# Patient Record
Sex: MALE | Race: OTHER | NOT HISPANIC OR LATINO
[De-identification: names, ages, dates, MRNs, and addresses within clinical notes are randomized per-mention and may not be internally consistent; named-entity substitution may affect disease eponyms.]

---

## 2017-02-15 ENCOUNTER — APPOINTMENT (OUTPATIENT)
Dept: CARDIOLOGY | Facility: CLINIC | Age: 57
End: 2017-02-15

## 2017-02-15 ENCOUNTER — NON-APPOINTMENT (OUTPATIENT)
Age: 57
End: 2017-02-15

## 2017-02-15 VITALS
DIASTOLIC BLOOD PRESSURE: 87 MMHG | RESPIRATION RATE: 14 BRPM | HEIGHT: 69 IN | HEART RATE: 70 BPM | OXYGEN SATURATION: 97 % | WEIGHT: 180 LBS | BODY MASS INDEX: 26.66 KG/M2 | SYSTOLIC BLOOD PRESSURE: 133 MMHG

## 2017-03-16 ENCOUNTER — EMERGENCY (EMERGENCY)
Facility: HOSPITAL | Age: 57
LOS: 0 days | Discharge: HOME | End: 2017-03-16
Attending: STUDENT IN AN ORGANIZED HEALTH CARE EDUCATION/TRAINING PROGRAM
Payer: COMMERCIAL

## 2017-03-16 VITALS
SYSTOLIC BLOOD PRESSURE: 159 MMHG | DIASTOLIC BLOOD PRESSURE: 96 MMHG | RESPIRATION RATE: 17 BRPM | OXYGEN SATURATION: 99 % | HEART RATE: 78 BPM | TEMPERATURE: 98 F | WEIGHT: 179.9 LBS | HEIGHT: 65 IN

## 2017-03-16 VITALS
DIASTOLIC BLOOD PRESSURE: 86 MMHG | OXYGEN SATURATION: 96 % | RESPIRATION RATE: 18 BRPM | HEART RATE: 64 BPM | TEMPERATURE: 98 F | SYSTOLIC BLOOD PRESSURE: 156 MMHG

## 2017-03-16 DIAGNOSIS — R19.7 DIARRHEA, UNSPECIFIED: ICD-10-CM

## 2017-03-16 DIAGNOSIS — I25.2 OLD MYOCARDIAL INFARCTION: ICD-10-CM

## 2017-03-16 DIAGNOSIS — I10 ESSENTIAL (PRIMARY) HYPERTENSION: ICD-10-CM

## 2017-03-16 DIAGNOSIS — Z79.82 LONG TERM (CURRENT) USE OF ASPIRIN: ICD-10-CM

## 2017-03-16 DIAGNOSIS — J18.9 PNEUMONIA, UNSPECIFIED ORGANISM: ICD-10-CM

## 2017-03-16 LAB
ALBUMIN SERPL ELPH-MCNC: 4 G/DL — SIGNIFICANT CHANGE UP (ref 3.3–5)
ALP SERPL-CCNC: 82 U/L — SIGNIFICANT CHANGE UP (ref 40–120)
ALT FLD-CCNC: 36 U/L — SIGNIFICANT CHANGE UP (ref 12–78)
AMYLASE P1 CFR SERPL: 34 U/L — SIGNIFICANT CHANGE UP (ref 25–115)
ANION GAP SERPL CALC-SCNC: 12 MMOL/L — SIGNIFICANT CHANGE UP (ref 5–17)
AST SERPL-CCNC: 18 U/L — SIGNIFICANT CHANGE UP (ref 15–37)
BASOPHILS # BLD AUTO: 0.1 K/UL — SIGNIFICANT CHANGE UP (ref 0–0.2)
BASOPHILS NFR BLD AUTO: 0.9 % — SIGNIFICANT CHANGE UP (ref 0–2)
BILIRUB DIRECT SERPL-MCNC: 0.13 MG/DL — SIGNIFICANT CHANGE UP (ref 0.05–0.2)
BILIRUB INDIRECT FLD-MCNC: 0.4 MG/DL — SIGNIFICANT CHANGE UP (ref 0.2–1)
BILIRUB SERPL-MCNC: 0.5 MG/DL — SIGNIFICANT CHANGE UP (ref 0.2–1.2)
BUN SERPL-MCNC: 8 MG/DL — SIGNIFICANT CHANGE UP (ref 7–23)
CALCIUM SERPL-MCNC: 8.6 MG/DL — SIGNIFICANT CHANGE UP (ref 8.5–10.1)
CHLORIDE SERPL-SCNC: 104 MMOL/L — SIGNIFICANT CHANGE UP (ref 96–108)
CO2 SERPL-SCNC: 24 MMOL/L — SIGNIFICANT CHANGE UP (ref 22–31)
CREAT SERPL-MCNC: 0.86 MG/DL — SIGNIFICANT CHANGE UP (ref 0.5–1.3)
EOSINOPHIL # BLD AUTO: 0.3 K/UL — SIGNIFICANT CHANGE UP (ref 0–0.5)
EOSINOPHIL NFR BLD AUTO: 4.8 % — SIGNIFICANT CHANGE UP (ref 0–6)
GLUCOSE SERPL-MCNC: 117 MG/DL — HIGH (ref 70–99)
HCT VFR BLD CALC: 44.1 % — SIGNIFICANT CHANGE UP (ref 39–50)
HGB BLD-MCNC: 16.2 G/DL — SIGNIFICANT CHANGE UP (ref 13–17)
LIDOCAIN IGE QN: 71 U/L — LOW (ref 73–393)
LYMPHOCYTES # BLD AUTO: 1.8 K/UL — SIGNIFICANT CHANGE UP (ref 1–3.3)
LYMPHOCYTES # BLD AUTO: 28.7 % — SIGNIFICANT CHANGE UP (ref 13–44)
MCHC RBC-ENTMCNC: 32.7 PG — SIGNIFICANT CHANGE UP (ref 27–34)
MCHC RBC-ENTMCNC: 36.8 GM/DL — HIGH (ref 32–36)
MCV RBC AUTO: 88.9 FL — SIGNIFICANT CHANGE UP (ref 80–100)
MONOCYTES # BLD AUTO: 0.3 K/UL — SIGNIFICANT CHANGE UP (ref 0–0.9)
MONOCYTES NFR BLD AUTO: 5.2 % — SIGNIFICANT CHANGE UP (ref 2–14)
NEUTROPHILS # BLD AUTO: 3.9 K/UL — SIGNIFICANT CHANGE UP (ref 1.8–7.4)
NEUTROPHILS NFR BLD AUTO: 60.4 % — SIGNIFICANT CHANGE UP (ref 43–77)
PLATELET # BLD AUTO: 165 K/UL — SIGNIFICANT CHANGE UP (ref 150–400)
POTASSIUM SERPL-MCNC: 4 MMOL/L — SIGNIFICANT CHANGE UP (ref 3.5–5.3)
POTASSIUM SERPL-SCNC: 4 MMOL/L — SIGNIFICANT CHANGE UP (ref 3.5–5.3)
PROT SERPL-MCNC: 7.2 GM/DL — SIGNIFICANT CHANGE UP (ref 6–8.3)
RBC # BLD: 4.96 M/UL — SIGNIFICANT CHANGE UP (ref 4.2–5.8)
RBC # FLD: 11.2 % — SIGNIFICANT CHANGE UP (ref 11–15)
SODIUM SERPL-SCNC: 140 MMOL/L — SIGNIFICANT CHANGE UP (ref 135–145)
WBC # BLD: 6.4 K/UL — SIGNIFICANT CHANGE UP (ref 3.8–10.5)
WBC # FLD AUTO: 6.4 K/UL — SIGNIFICANT CHANGE UP (ref 3.8–10.5)

## 2017-03-16 PROCEDURE — 71020: CPT | Mod: 26

## 2017-03-16 PROCEDURE — 99285 EMERGENCY DEPT VISIT HI MDM: CPT

## 2017-03-16 PROCEDURE — 74177 CT ABD & PELVIS W/CONTRAST: CPT | Mod: 26

## 2017-03-16 RX ORDER — IOHEXOL 300 MG/ML
30 INJECTION, SOLUTION INTRAVENOUS ONCE
Qty: 0 | Refills: 0 | Status: COMPLETED | OUTPATIENT
Start: 2017-03-16 | End: 2017-03-16

## 2017-03-16 RX ORDER — SODIUM CHLORIDE 9 MG/ML
1000 INJECTION INTRAMUSCULAR; INTRAVENOUS; SUBCUTANEOUS
Qty: 0 | Refills: 0 | Status: DISCONTINUED | OUTPATIENT
Start: 2017-03-16 | End: 2017-03-16

## 2017-03-16 RX ADMIN — IOHEXOL 30 MILLILITER(S): 300 INJECTION, SOLUTION INTRAVENOUS at 15:30

## 2017-03-16 RX ADMIN — SODIUM CHLORIDE 125 MILLILITER(S): 9 INJECTION INTRAMUSCULAR; INTRAVENOUS; SUBCUTANEOUS at 14:50

## 2017-03-16 NOTE — ED PROVIDER NOTE - MEDICAL DECISION MAKING DETAILS
pt presents today with lower abdominal pain which started during the night, labs and ct to rule out colitis, pt's pain improved own its own and he did not require pain medications pt presents today with lower abdominal pain which started during the night, labs and ct,pt's pain improved own its own and he did not require pain medications, ct did show diverticulosis and picked up a lower lobe infiltrate, chest xray ordered and confirms a LLL pneumonia, labs are within normal, pt is in no respiratory distress and looks well, first dose of antibiotic ordered IV, pt told to follow up with his pmd

## 2017-03-16 NOTE — ED PROVIDER NOTE - PROGRESS NOTE DETAILS
pt stable, ct abd/pelv did  a lower lobe pneumonia, pt asked about cough and states that he has been having a productive cough, (-) fevers (-) sick contacts (-) bodyaches or chills

## 2017-03-16 NOTE — ED PROVIDER NOTE - OBJECTIVE STATEMENT
56 year old male with h/o HTN, prior MI with one cardiac stent presents today c/o lower abdominal pain and nonbloody diarrhea which started in the middle of the night, he describes a burning pain in the lower abdomen rated 8/10 but now has improved to 6/10 (-) nausea (-) vomiting (-) fevers or chills (-) chest pains (-) sob(-) weakness (-) sick contacts

## 2017-03-16 NOTE — ED PROVIDER NOTE - CARE PLAN
Principal Discharge DX:	Abdominal pain  Secondary Diagnosis:	Diarrhea Principal Discharge DX:	Pneumonia  Secondary Diagnosis:	Diarrhea

## 2017-03-16 NOTE — ED ADULT NURSE NOTE - OBJECTIVE STATEMENT
received er bed 12 c/o diarrhea and lower abdominal cramps since middle of the night, denies n/v no blood noted in stools per pt abdomen soft, no distention noted

## 2017-05-15 ENCOUNTER — RX RENEWAL (OUTPATIENT)
Age: 57
End: 2017-05-15

## 2017-06-01 ENCOUNTER — INPATIENT (INPATIENT)
Facility: HOSPITAL | Age: 57
LOS: 0 days | Discharge: HOME | End: 2017-06-02
Attending: INTERNAL MEDICINE | Admitting: INTERNAL MEDICINE
Payer: COMMERCIAL

## 2017-06-01 VITALS
RESPIRATION RATE: 16 BRPM | SYSTOLIC BLOOD PRESSURE: 146 MMHG | OXYGEN SATURATION: 100 % | TEMPERATURE: 98 F | HEART RATE: 85 BPM | DIASTOLIC BLOOD PRESSURE: 85 MMHG

## 2017-06-01 DIAGNOSIS — Z98.890 OTHER SPECIFIED POSTPROCEDURAL STATES: Chronic | ICD-10-CM

## 2017-06-01 DIAGNOSIS — I24.9 ACUTE ISCHEMIC HEART DISEASE, UNSPECIFIED: ICD-10-CM

## 2017-06-01 DIAGNOSIS — E78.5 HYPERLIPIDEMIA, UNSPECIFIED: ICD-10-CM

## 2017-06-01 DIAGNOSIS — Z29.9 ENCOUNTER FOR PROPHYLACTIC MEASURES, UNSPECIFIED: ICD-10-CM

## 2017-06-01 DIAGNOSIS — I10 ESSENTIAL (PRIMARY) HYPERTENSION: ICD-10-CM

## 2017-06-01 LAB
ALBUMIN SERPL ELPH-MCNC: 4.5 G/DL — SIGNIFICANT CHANGE UP (ref 3.3–5)
ALP SERPL-CCNC: 69 U/L — SIGNIFICANT CHANGE UP (ref 40–120)
ALT FLD-CCNC: 22 U/L — SIGNIFICANT CHANGE UP (ref 4–41)
APTT BLD: 32.3 SEC — SIGNIFICANT CHANGE UP (ref 27.5–37.4)
AST SERPL-CCNC: 14 U/L — SIGNIFICANT CHANGE UP (ref 4–40)
BASOPHILS # BLD AUTO: 0.04 K/UL — SIGNIFICANT CHANGE UP (ref 0–0.2)
BASOPHILS NFR BLD AUTO: 0.4 % — SIGNIFICANT CHANGE UP (ref 0–2)
BILIRUB SERPL-MCNC: 0.3 MG/DL — SIGNIFICANT CHANGE UP (ref 0.2–1.2)
BUN SERPL-MCNC: 19 MG/DL — SIGNIFICANT CHANGE UP (ref 7–23)
CALCIUM SERPL-MCNC: 9.4 MG/DL — SIGNIFICANT CHANGE UP (ref 8.4–10.5)
CHLORIDE SERPL-SCNC: 103 MMOL/L — SIGNIFICANT CHANGE UP (ref 98–107)
CK MB BLD-MCNC: 2.32 NG/ML — SIGNIFICANT CHANGE UP (ref 1–6.6)
CK MB BLD-MCNC: 2.85 NG/ML — SIGNIFICANT CHANGE UP (ref 1–6.6)
CK SERPL-CCNC: 78 U/L — SIGNIFICANT CHANGE UP (ref 30–200)
CK SERPL-CCNC: 95 U/L — SIGNIFICANT CHANGE UP (ref 30–200)
CO2 SERPL-SCNC: 22 MMOL/L — SIGNIFICANT CHANGE UP (ref 22–31)
CREAT SERPL-MCNC: 0.96 MG/DL — SIGNIFICANT CHANGE UP (ref 0.5–1.3)
EOSINOPHIL # BLD AUTO: 0.51 K/UL — HIGH (ref 0–0.5)
EOSINOPHIL NFR BLD AUTO: 5.2 % — SIGNIFICANT CHANGE UP (ref 0–6)
GLUCOSE SERPL-MCNC: 127 MG/DL — HIGH (ref 70–99)
HCT VFR BLD CALC: 44.6 % — SIGNIFICANT CHANGE UP (ref 39–50)
HGB BLD-MCNC: 14.8 G/DL — SIGNIFICANT CHANGE UP (ref 13–17)
IMM GRANULOCYTES NFR BLD AUTO: 0.4 % — SIGNIFICANT CHANGE UP (ref 0–1.5)
INR BLD: 0.91 — SIGNIFICANT CHANGE UP (ref 0.88–1.17)
LYMPHOCYTES # BLD AUTO: 2.06 K/UL — SIGNIFICANT CHANGE UP (ref 1–3.3)
LYMPHOCYTES # BLD AUTO: 20.9 % — SIGNIFICANT CHANGE UP (ref 13–44)
MCHC RBC-ENTMCNC: 31.2 PG — SIGNIFICANT CHANGE UP (ref 27–34)
MCHC RBC-ENTMCNC: 33.2 % — SIGNIFICANT CHANGE UP (ref 32–36)
MCV RBC AUTO: 93.9 FL — SIGNIFICANT CHANGE UP (ref 80–100)
MONOCYTES # BLD AUTO: 0.54 K/UL — SIGNIFICANT CHANGE UP (ref 0–0.9)
MONOCYTES NFR BLD AUTO: 5.5 % — SIGNIFICANT CHANGE UP (ref 2–14)
NEUTROPHILS # BLD AUTO: 6.68 K/UL — SIGNIFICANT CHANGE UP (ref 1.8–7.4)
NEUTROPHILS NFR BLD AUTO: 67.6 % — SIGNIFICANT CHANGE UP (ref 43–77)
PLATELET # BLD AUTO: 222 K/UL — SIGNIFICANT CHANGE UP (ref 150–400)
PMV BLD: 10.3 FL — SIGNIFICANT CHANGE UP (ref 7–13)
POTASSIUM SERPL-MCNC: 4.2 MMOL/L — SIGNIFICANT CHANGE UP (ref 3.5–5.3)
POTASSIUM SERPL-SCNC: 4.2 MMOL/L — SIGNIFICANT CHANGE UP (ref 3.5–5.3)
PROT SERPL-MCNC: 7 G/DL — SIGNIFICANT CHANGE UP (ref 6–8.3)
PROTHROM AB SERPL-ACNC: 10.2 SEC — SIGNIFICANT CHANGE UP (ref 9.8–13.1)
RBC # BLD: 4.75 M/UL — SIGNIFICANT CHANGE UP (ref 4.2–5.8)
RBC # FLD: 12.4 % — SIGNIFICANT CHANGE UP (ref 10.3–14.5)
SODIUM SERPL-SCNC: 140 MMOL/L — SIGNIFICANT CHANGE UP (ref 135–145)
TROPONIN T SERPL-MCNC: < 0.06 NG/ML — SIGNIFICANT CHANGE UP (ref 0–0.06)
TROPONIN T SERPL-MCNC: < 0.06 NG/ML — SIGNIFICANT CHANGE UP (ref 0–0.06)
WBC # BLD: 9.87 K/UL — SIGNIFICANT CHANGE UP (ref 3.8–10.5)
WBC # FLD AUTO: 9.87 K/UL — SIGNIFICANT CHANGE UP (ref 3.8–10.5)

## 2017-06-01 PROCEDURE — 71020: CPT | Mod: 26

## 2017-06-01 RX ORDER — ASPIRIN/CALCIUM CARB/MAGNESIUM 324 MG
162 TABLET ORAL ONCE
Qty: 0 | Refills: 0 | Status: COMPLETED | OUTPATIENT
Start: 2017-06-01 | End: 2017-06-01

## 2017-06-01 RX ORDER — ISOSORBIDE MONONITRATE 60 MG/1
30 TABLET, EXTENDED RELEASE ORAL DAILY
Qty: 0 | Refills: 0 | Status: DISCONTINUED | OUTPATIENT
Start: 2017-06-01 | End: 2017-06-02

## 2017-06-01 RX ORDER — CARVEDILOL PHOSPHATE 80 MG/1
3.12 CAPSULE, EXTENDED RELEASE ORAL EVERY 12 HOURS
Qty: 0 | Refills: 0 | Status: DISCONTINUED | OUTPATIENT
Start: 2017-06-01 | End: 2017-06-02

## 2017-06-01 RX ORDER — SODIUM CHLORIDE 9 MG/ML
3 INJECTION INTRAMUSCULAR; INTRAVENOUS; SUBCUTANEOUS EVERY 8 HOURS
Qty: 0 | Refills: 0 | Status: DISCONTINUED | OUTPATIENT
Start: 2017-06-01 | End: 2017-06-02

## 2017-06-01 RX ORDER — TICAGRELOR 90 MG/1
90 TABLET ORAL
Qty: 0 | Refills: 0 | Status: DISCONTINUED | OUTPATIENT
Start: 2017-06-01 | End: 2017-06-02

## 2017-06-01 RX ORDER — ASPIRIN/CALCIUM CARB/MAGNESIUM 324 MG
81 TABLET ORAL DAILY
Qty: 0 | Refills: 0 | Status: DISCONTINUED | OUTPATIENT
Start: 2017-06-01 | End: 2017-06-02

## 2017-06-01 RX ORDER — ATORVASTATIN CALCIUM 80 MG/1
80 TABLET, FILM COATED ORAL AT BEDTIME
Qty: 0 | Refills: 0 | Status: DISCONTINUED | OUTPATIENT
Start: 2017-06-01 | End: 2017-06-02

## 2017-06-01 RX ORDER — LISINOPRIL 2.5 MG/1
5 TABLET ORAL DAILY
Qty: 0 | Refills: 0 | Status: DISCONTINUED | OUTPATIENT
Start: 2017-06-01 | End: 2017-06-02

## 2017-06-01 RX ORDER — ENOXAPARIN SODIUM 100 MG/ML
40 INJECTION SUBCUTANEOUS EVERY 24 HOURS
Qty: 0 | Refills: 0 | Status: DISCONTINUED | OUTPATIENT
Start: 2017-06-01 | End: 2017-06-02

## 2017-06-01 RX ADMIN — SODIUM CHLORIDE 3 MILLILITER(S): 9 INJECTION INTRAMUSCULAR; INTRAVENOUS; SUBCUTANEOUS at 22:25

## 2017-06-01 RX ADMIN — TICAGRELOR 90 MILLIGRAM(S): 90 TABLET ORAL at 22:25

## 2017-06-01 RX ADMIN — Medication 162 MILLIGRAM(S): at 15:09

## 2017-06-01 RX ADMIN — ATORVASTATIN CALCIUM 80 MILLIGRAM(S): 80 TABLET, FILM COATED ORAL at 22:25

## 2017-06-01 NOTE — H&P ADULT - NSHPLABSRESULTS_GEN_ALL_CORE
NSR @ 73b/ min, IRBBB, LVH, Q wave 3, V1  CXR preliminary = clear                         14.8   9.87  )-----------( 222      ( 01 Jun 2017 14:45 )             44.6   06-01    140  |  103  |  19  ----------------------------<  127<H>  4.2   |  22  |  0.96    Ca    9.4      01 Jun 2017 14:45    TPro  7.0  /  Alb  4.5  /  TBili  0.3  /  DBili  x   /  AST  14  /  ALT  22  /  AlkPhos  69  06-01    CARDIAC MARKERS ( 01 Jun 2017 14:45 )  x     / < 0.06 ng/mL / 95 u/L / 2.85 ng/mL / x

## 2017-06-01 NOTE — ED ADULT TRIAGE NOTE - CHIEF COMPLAINT QUOTE
Pt c/o Lt sided chest pain, intermittent, radiating to Lt side of his back for the past week. Pt denies sob, breathing even and unlabored resp at this time. Pt has Hx of 1 stent placed, htn. EKG obtained.

## 2017-06-01 NOTE — CONSULT NOTE ADULT - SUBJECTIVE AND OBJECTIVE BOX
Date of Admission: 6/1/2017    CHIEF COMPLAINT: chest pain    HISTORY OF PRESENT ILLNESS:  56M HTN, HLD, CAD s/p AWSTEMI 10/2016 c/b mild-mod LV dysfunction s/p synergy SOLE to prox and mid LAD presents with chest pain x several days duration. Chest pain radiates to left back, is non-exertional and non-positional. Pain can last up to several hours, and is worse with deep inspirations. Denies any dyspnea. Denies any fevers, chills or recent viral illness. Denies any PND, orthopnea or bilateral or unilateral LE edema. Denies any palpitations or syncope.      Allergies: NKA    MEDICATIONS:  asa 81  brilinta 90 bid  lisinopril 5  coreg 3.125  lipitor 80  imdur 30      PAST MEDICAL & SURGICAL HISTORY:  MI, old  HTN (hypertension)  No significant past surgical history      FAMILY HISTORY:  No pertinent family history in first degree relatives      SOCIAL HISTORY:    [ ] Non-smoker  [ ] Smoker  [ ] Alcohol      REVIEW OF SYSTEMS:  See HPI. Otherwise, 10 point ROS done and otherwise negative.    PHYSICAL EXAM:  T(C): 36.8, Max: 36.8 (06-01 @ 14:30)  HR: 85 (85 - 85)  BP: 146/85 (146/85 - 146/85)  RR: 16 (16 - 16)  SpO2: 100% (100% - 100%)  Wt(kg): --  I&O's Summary      Appearance: Normal	  HEENT:   Normal oral mucosa	  Lymphatic: No lymphadenopathy  Cardiovascular: Normal S1 S2, No JVD, No murmurs, No edema  Respiratory: Lungs clear to auscultation	  Psychiatry: A & O x 3, Mood & affect appropriate  Gastrointestinal:  Soft, Non-tender  Skin: No rashes, No ecchymoses, No cyanosis	  Neurologic: Non-focal  Extremities: Normal range of motion, No clubbing, cyanosis or edema      LABS:	 	    CBC Full  -  ( 01 Jun 2017 14:45 )  WBC Count : 9.87 K/uL  Hemoglobin : 14.8 g/dL  Hematocrit : 44.6 %  Platelet Count - Automated : 222 K/uL      06-01  140  |  103  |  19  ----------------------------<  127<H>  4.2   |  22  |  0.96        ECG: NSR iRBBB LVH  RADIOLOGY:  -CXR: (impression) - normal   OTHER: 	    PREVIOUS DIAGNOSTIC TESTING:    [ ] Echocardiogram: reviewed  [ ]  Catheterization: reviewed  	  ASSESSMENT/PLAN: 	  56M HTN, HLD, CAD s/p AWSTEMI 10/2016 c/b mild-mod LV dysfunction s/p synergy SOLE to prox and mid LAD presents with atypical chest pain, EKG and enzymes not suggestive of ACS  -cycle CE  -f/u official CXR read  -would obtain repeat TTE to apical aneurysm and evaluate LV function  -if CE neg, obtain exercise stress test with nuclear imaging  -c/w [facility]    Dennis Warren [id number]

## 2017-06-01 NOTE — ED ADULT NURSE NOTE - CHPI ED SYMPTOMS NEG
no nausea/no shortness of breath/no fever/no diaphoresis/no syncope/no vomiting/no dizziness/no cough

## 2017-06-01 NOTE — ED PROVIDER NOTE - ATTENDING CONTRIBUTION TO CARE
Pt was seen and evaluated by me. Pt states over the past week having intermittent left sided chest pain, achy in nature. Pt denies any associated SOB, nausea, vomiting, or abd pain. Pt has a history of CAD with 1 previous stent place. Lungs CTA b/l. RRR. Abd soft, non-tender.

## 2017-06-01 NOTE — H&P ADULT - NEGATIVE ENMT SYMPTOMS
no nose bleeds/no nasal congestion/no vertigo/no throat pain/no tinnitus/no sinus symptoms/no abnormal taste sensation/no gum bleeding

## 2017-06-01 NOTE — H&P ADULT - RS GEN PE MLT RESP DETAILS PC
breath sounds equal/clear to auscultation bilaterally/good air movement/airway patent/respirations non-labored

## 2017-06-01 NOTE — H&P ADULT - NSHPSOCIALHISTORY_GEN_ALL_CORE
and lives with spouse.  Smokes Nicotine 1/2p/d x 20 years, Does not want Nicotine replacement  ETOH 2 drinks/ day

## 2017-06-01 NOTE — H&P ADULT - PROBLEM SELECTOR PLAN 1
CM  F/U CE, EKG, TTE  give O2, ASA, Brilinta, BB, ACE, Nitrate, Statin  IF CE are negative. Pt to get a stress test

## 2017-06-01 NOTE — ED ADULT NURSE NOTE - OBJECTIVE STATEMENT
55 y/o male presents to ED with c/o chest discomfort.  Pt states that he started having cramping left sided chest pain that extends around to his back.  Pt states that the pain has been constant but varies in severity, pt states that the pain is worse when he gets out of bed in the morning. Pt has a hx of cardiac stent in Oct 2016, the pain that he is experiencing today is not like the pain when stent was placed.  Pt denies n/v/d, no cough or SOB, no diaphoresis, denies palpitations, no fever/chills.

## 2017-06-01 NOTE — ED PROVIDER NOTE - OBJECTIVE STATEMENT
57 y/o male with PMHx of CAD and HTN presents to ED left sided chest pain X 1 week. Pt states over the past week having intermittent achy left sided chest pain. Pt denies any associated fever, chills, nausea, vomiting, SOB, or abd pain. Pt states symptoms last a few hours then resolve. Pt has a previous stent and follows with Dr. Ellison.

## 2017-06-01 NOTE — ED PROVIDER NOTE - MEDICAL DECISION MAKING DETAILS
55 y/o male with left sided chest pain and history of CAD with previous stent. Concern for ACS. Labs, EKG, CXR

## 2017-06-01 NOTE — H&P ADULT - HISTORY OF PRESENT ILLNESS
56M with a history of HTN, dyslipidemia, CAD s/p Anterior Wall STEMI 10/2016, mild-mod LV dysfunction s/p synergy SOLE to prox and mid LAD experiencing intermittent, exertional, L sided chest, squeezing sensation that radiates to his L side back, intermittent with a 3 to 8/ 10 intensity, subsides on it's own and occurs 2 to 3 times a day, positive SOB, dry cough, no nausea vomit, chills, diaphoresis.     In the ED, josie pt is currently chest pain free, CE negative x 1, seen by the cardio fellow. Their consult and recommendations are in the chart.

## 2017-06-01 NOTE — H&P ADULT - NEGATIVE NEUROLOGICAL SYMPTOMS
no generalized seizures/no difficulty walking/no vertigo/no paresthesias/no tremors/no transient paralysis/no syncope/no weakness/no focal seizures

## 2017-06-02 VITALS — WEIGHT: 193.35 LBS

## 2017-06-02 LAB
BUN SERPL-MCNC: 18 MG/DL — SIGNIFICANT CHANGE UP (ref 7–23)
CALCIUM SERPL-MCNC: 9.4 MG/DL — SIGNIFICANT CHANGE UP (ref 8.4–10.5)
CHLORIDE SERPL-SCNC: 102 MMOL/L — SIGNIFICANT CHANGE UP (ref 98–107)
CHOLEST SERPL-MCNC: 119 MG/DL — LOW (ref 120–199)
CO2 SERPL-SCNC: 22 MMOL/L — SIGNIFICANT CHANGE UP (ref 22–31)
CREAT SERPL-MCNC: 0.79 MG/DL — SIGNIFICANT CHANGE UP (ref 0.5–1.3)
GLUCOSE SERPL-MCNC: 126 MG/DL — HIGH (ref 70–99)
HBA1C BLD-MCNC: 6.7 % — HIGH (ref 4–5.6)
HCT VFR BLD CALC: 44.3 % — SIGNIFICANT CHANGE UP (ref 39–50)
HDLC SERPL-MCNC: 54 MG/DL — SIGNIFICANT CHANGE UP (ref 35–55)
HGB BLD-MCNC: 14.9 G/DL — SIGNIFICANT CHANGE UP (ref 13–17)
LIPID PNL WITH DIRECT LDL SERPL: 49 MG/DL — SIGNIFICANT CHANGE UP
MCHC RBC-ENTMCNC: 31.7 PG — SIGNIFICANT CHANGE UP (ref 27–34)
MCHC RBC-ENTMCNC: 33.6 % — SIGNIFICANT CHANGE UP (ref 32–36)
MCV RBC AUTO: 94.3 FL — SIGNIFICANT CHANGE UP (ref 80–100)
PLATELET # BLD AUTO: 185 K/UL — SIGNIFICANT CHANGE UP (ref 150–400)
PMV BLD: 10.9 FL — SIGNIFICANT CHANGE UP (ref 7–13)
POTASSIUM SERPL-MCNC: 3.9 MMOL/L — SIGNIFICANT CHANGE UP (ref 3.5–5.3)
POTASSIUM SERPL-SCNC: 3.9 MMOL/L — SIGNIFICANT CHANGE UP (ref 3.5–5.3)
RBC # BLD: 4.7 M/UL — SIGNIFICANT CHANGE UP (ref 4.2–5.8)
RBC # FLD: 12.7 % — SIGNIFICANT CHANGE UP (ref 10.3–14.5)
SODIUM SERPL-SCNC: 140 MMOL/L — SIGNIFICANT CHANGE UP (ref 135–145)
TRIGL SERPL-MCNC: 90 MG/DL — SIGNIFICANT CHANGE UP (ref 10–149)
WBC # BLD: 8.06 K/UL — SIGNIFICANT CHANGE UP (ref 3.8–10.5)
WBC # FLD AUTO: 8.06 K/UL — SIGNIFICANT CHANGE UP (ref 3.8–10.5)

## 2017-06-02 PROCEDURE — 78452 HT MUSCLE IMAGE SPECT MULT: CPT | Mod: 26,59

## 2017-06-02 PROCEDURE — 93306 TTE W/DOPPLER COMPLETE: CPT | Mod: 26

## 2017-06-02 PROCEDURE — 93018 CV STRESS TEST I&R ONLY: CPT | Mod: GC

## 2017-06-02 PROCEDURE — 99222 1ST HOSP IP/OBS MODERATE 55: CPT

## 2017-06-02 PROCEDURE — 93016 CV STRESS TEST SUPVJ ONLY: CPT | Mod: GC

## 2017-06-02 RX ORDER — TICAGRELOR 90 MG/1
1 TABLET ORAL
Qty: 0 | Refills: 0 | COMMUNITY

## 2017-06-02 RX ADMIN — SODIUM CHLORIDE 3 MILLILITER(S): 9 INJECTION INTRAMUSCULAR; INTRAVENOUS; SUBCUTANEOUS at 05:33

## 2017-06-02 RX ADMIN — CARVEDILOL PHOSPHATE 3.12 MILLIGRAM(S): 80 CAPSULE, EXTENDED RELEASE ORAL at 05:33

## 2017-06-02 RX ADMIN — SODIUM CHLORIDE 3 MILLILITER(S): 9 INJECTION INTRAMUSCULAR; INTRAVENOUS; SUBCUTANEOUS at 13:57

## 2017-06-02 RX ADMIN — LISINOPRIL 5 MILLIGRAM(S): 2.5 TABLET ORAL at 05:33

## 2017-06-02 RX ADMIN — ISOSORBIDE MONONITRATE 30 MILLIGRAM(S): 60 TABLET, EXTENDED RELEASE ORAL at 11:40

## 2017-06-02 RX ADMIN — Medication 81 MILLIGRAM(S): at 11:40

## 2017-06-02 RX ADMIN — TICAGRELOR 90 MILLIGRAM(S): 90 TABLET ORAL at 05:33

## 2017-06-02 RX ADMIN — ENOXAPARIN SODIUM 40 MILLIGRAM(S): 100 INJECTION SUBCUTANEOUS at 05:33

## 2017-06-02 NOTE — PATIENT PROFILE ADULT. - NS TRANSFER PATIENT BELONGINGS
Cell Phone/PDA (specify)/Clothing/1 [employer] grey flip open cell phone without , 1 pair of jeans, 1 pair of black socks, 1 pair of black/white nike sneakers, 1 black [facility] backpack

## 2017-06-02 NOTE — DISCHARGE NOTE ADULT - PLAN OF CARE
compliance with medications, follow up with physicians activity - as tolerated and with assistance   low salt &  low cholesterol follow up with your doctor in a week Dr Fontana [phone] have patient outpatient follow up

## 2017-06-02 NOTE — DISCHARGE NOTE ADULT - MEDICATION SUMMARY - MEDICATIONS TO STOP TAKING
I will STOP taking the medications listed below when I get home from the hospital:    Levaquin 750 mg oral tablet  -- 1 tab(s) by mouth every 24 hours  -- Avoid prolonged or excessive exposure to direct and/or artificial sunlight while taking this medication.  Do not take dairy products, antacids, or iron preparations within one hour of this medication.  Finish all this medication unless otherwise directed by prescriber.  May cause drowsiness or dizziness.  Medication should be taken with plenty of water.    codeine-guaiFENesin 10 mg-100 mg/5 mL oral syrup  -- 10 milliliter(s) by mouth every 6 hours MDD:40 (fourty)  -- Caution federal law prohibits the transfer of this drug to any person other  than the person for whom it was prescribed.  May cause drowsiness.  Alcohol may intensify this effect.  Use care when operating dangerous machinery.  Medication should be taken with plenty of water.  Obtain medical advice before taking any non-prescription drugs as some may affect the action of this medication.

## 2017-06-02 NOTE — DISCHARGE NOTE ADULT - ADDITIONAL INSTRUCTIONS
follow up with your doctor in a week Dr Renee [phone] follow up with your doctor in a week Dr Renee [phone]  follow up with your doctor in a week Dr Douglas Crockett

## 2017-06-02 NOTE — DISCHARGE NOTE ADULT - MEDICATION SUMMARY - MEDICATIONS TO TAKE
I will START or STAY ON the medications listed below when I get home from the hospital:    aspirin 81 mg oral delayed release tablet  -- 1 tab(s) by mouth once a day  -- Indication: For cardiac protection    lisinopril 5 mg oral tablet  -- 1 tab(s) by mouth once a day  -- Indication: For blood pressure    isosorbide mononitrate 30 mg oral tablet, extended release  -- 1 tab(s) by mouth once a day  -- Indication: For chest pain    atorvastatin 80 mg oral tablet  -- 1 tab(s) by mouth once a day (at bedtime)  -- Indication: For cholesterol    ticagrelor 90 mg oral tablet  -- 1 tab(s) by mouth 2 times a day  -- Indication: For Heart disease    carvedilol 3.125 mg oral tablet  -- 1 tab(s) by mouth every 12 hours  -- Indication: For Heart disease

## 2017-06-02 NOTE — DISCHARGE NOTE ADULT - HOSPITAL COURSE
56M admitted for chest pain r/o ACS.    CXR = clear  EKG NSR @ 73b/ min , IRBBB, LVH  CE negative x 2  Glucose = 127  Cardio consult in the chart:   -cycle CE  -f/u official CXR read  -would obtain repeat TTE to apical aneurysm and evaluate LV function  -if CE neg, obtain exercise stress test with nuclear imaging  -c/w home meds  6/2- TTE-  Normal left ventricular internal dimensions and wall  thicknesses.  2. Mild segmental left ventricular systolic dysfunction the  distal anteroseptum appears hypokinetic. Overall EF appears  preserved.  3. Mild diastolic dysfunction (Stage I).  4. Normal right ventricular size and function.  *** Compared with echocardiogram of 10/11/2016, the LV  function appears improved. 56M admitted for chest pain r/o ACS.    CXR = clear  EKG NSR @ 73b/ min , IRBBB, LVH  CE negative x 2  Glucose = 127  Cardio consult in the chart:   -cycle CE  -f/u official CXR read  -would obtain repeat TTE to apical aneurysm and evaluate LV function  -if CE neg, obtain exercise stress test with nuclear imaging  -c/w home meds  6/2- TTE-  Normal left ventricular internal dimensions and wall  thicknesses.  2. Mild segmental left ventricular systolic dysfunction the  distal anteroseptum appears hypokinetic. Overall EF appears  preserved.  3. Mild diastolic dysfunction (Stage I).  4. Normal right ventricular size and function.  *** Compared with echocardiogram of 10/11/2016, the LV  function appears improved.  Stress test was done and results was negative.

## 2017-06-02 NOTE — DISCHARGE NOTE ADULT - CARE PROVIDER_API CALL
Jd Renee (MD), Cardiovascular Disease; Internal Medicine; Nuclear Cardiology  [address] [address] [place] [address]  Phone: [phone]  Fax: [phone]

## 2017-06-02 NOTE — DISCHARGE NOTE ADULT - VISION (WITH CORRECTIVE LENSES IF THE PATIENT USUALLY WEARS THEM):
wears glasses to read fine print/Partially impaired: cannot see medication labels or newsprint, but can see obstacles in path, and the surrounding layout; can count fingers at arm's length

## 2017-06-02 NOTE — DISCHARGE NOTE ADULT - PATIENT PORTAL LINK FT
“You can access the [employer] Patient Portal, offered by [facility], by registering with the following website: [email or web address]”

## 2017-06-02 NOTE — PATIENT PROFILE ADULT. - VISION (WITH CORRECTIVE LENSES IF THE PATIENT USUALLY WEARS THEM):
Partially impaired: cannot see medication labels or newsprint, but can see obstacles in path, and the surrounding layout; can count fingers at arm's length/wears glasses to read fine print

## 2017-06-02 NOTE — DISCHARGE NOTE ADULT - CARE PLAN
Principal Discharge DX:	Acute coronary syndrome  Goal:	compliance with medications, follow up with physicians  Instructions for follow-up, activity and diet:	activity - as tolerated and with assistance   low salt &  low cholesterol  Secondary Diagnosis:	Acute kidney injury  Goal:	follow up with your doctor in a week Dr Fontana [phone]  Instructions for follow-up, activity and diet:	have patient outpatient follow up Principal Discharge DX:	Acute coronary syndrome  Goal:	compliance with medications, follow up with physicians  Instructions for follow-up, activity and diet:	activity - as tolerated and with assistance   low salt &  low cholesterol  Secondary Diagnosis:	Acute kidney injury  Goal:	follow up with your doctor in a week Dr Fontana [phone]  Instructions for follow-up, activity and diet:	have patient outpatient follow up Principal Discharge DX:	Acute coronary syndrome  Goal:	compliance with medications, follow up with physicians  Instructions for follow-up, activity and diet:	activity - as tolerated and with assistance   low salt &  low cholesterol  Secondary Diagnosis:	Acute kidney injury  Goal:	follow up with your doctor in a week Dr Fontana [phone]  Instructions for follow-up, activity and diet:	have patient outpatient follow up Principal Discharge DX:	Acute coronary syndrome  Goal:	compliance with medications, follow up with physicians  Instructions for follow-up, activity and diet:	activity - as tolerated and with assistance   low salt &  low cholesterol

## 2017-06-14 ENCOUNTER — APPOINTMENT (OUTPATIENT)
Dept: CARDIOLOGY | Facility: CLINIC | Age: 57
End: 2017-06-14

## 2017-06-14 ENCOUNTER — NON-APPOINTMENT (OUTPATIENT)
Age: 57
End: 2017-06-14

## 2017-06-14 VITALS
HEIGHT: 69 IN | HEART RATE: 70 BPM | SYSTOLIC BLOOD PRESSURE: 137 MMHG | BODY MASS INDEX: 28.44 KG/M2 | WEIGHT: 192 LBS | DIASTOLIC BLOOD PRESSURE: 89 MMHG | OXYGEN SATURATION: 93 %

## 2017-06-14 DIAGNOSIS — I25.2 OLD MYOCARDIAL INFARCTION: ICD-10-CM

## 2017-06-22 PROBLEM — I25.2 HISTORY OF ST ELEVATION MYOCARDIAL INFARCTION (STEMI): Status: RESOLVED | Noted: 2017-06-22 | Resolved: 2017-06-22

## 2017-07-10 ENCOUNTER — APPOINTMENT (OUTPATIENT)
Dept: GASTROENTEROLOGY | Facility: CLINIC | Age: 57
End: 2017-07-10

## 2017-07-10 VITALS
DIASTOLIC BLOOD PRESSURE: 80 MMHG | BODY MASS INDEX: 29.03 KG/M2 | OXYGEN SATURATION: 98 % | TEMPERATURE: 98.7 F | SYSTOLIC BLOOD PRESSURE: 140 MMHG | HEIGHT: 69 IN | WEIGHT: 196 LBS | HEART RATE: 85 BPM

## 2017-07-10 DIAGNOSIS — Z82.49 FAMILY HISTORY OF ISCHEMIC HEART DISEASE AND OTHER DISEASES OF THE CIRCULATORY SYSTEM: ICD-10-CM

## 2017-07-10 RX ORDER — MULTIVITAMIN
TABLET ORAL
Refills: 0 | Status: ACTIVE | COMMUNITY

## 2017-09-20 ENCOUNTER — APPOINTMENT (OUTPATIENT)
Dept: CARDIOLOGY | Facility: CLINIC | Age: 57
End: 2017-09-20

## 2017-10-30 ENCOUNTER — RX RENEWAL (OUTPATIENT)
Age: 57
End: 2017-10-30

## 2017-12-20 ENCOUNTER — APPOINTMENT (OUTPATIENT)
Dept: CARDIOLOGY | Facility: CLINIC | Age: 57
End: 2017-12-20
Payer: COMMERCIAL

## 2017-12-20 ENCOUNTER — NON-APPOINTMENT (OUTPATIENT)
Age: 57
End: 2017-12-20

## 2017-12-20 VITALS
HEIGHT: 69 IN | BODY MASS INDEX: 30.66 KG/M2 | HEART RATE: 89 BPM | WEIGHT: 207 LBS | RESPIRATION RATE: 14 BRPM | SYSTOLIC BLOOD PRESSURE: 123 MMHG | DIASTOLIC BLOOD PRESSURE: 88 MMHG

## 2017-12-20 PROCEDURE — 99214 OFFICE O/P EST MOD 30 MIN: CPT

## 2017-12-20 PROCEDURE — 93000 ELECTROCARDIOGRAM COMPLETE: CPT

## 2017-12-22 ENCOUNTER — INPATIENT (INPATIENT)
Facility: HOSPITAL | Age: 57
LOS: 0 days | Discharge: HOME | End: 2017-12-23
Attending: INTERNAL MEDICINE | Admitting: INTERNAL MEDICINE
Payer: COMMERCIAL

## 2017-12-22 ENCOUNTER — TRANSCRIPTION ENCOUNTER (OUTPATIENT)
Age: 57
End: 2017-12-22

## 2017-12-22 VITALS — HEIGHT: 69 IN | WEIGHT: 207.01 LBS

## 2017-12-22 DIAGNOSIS — R82.90 UNSPECIFIED ABNORMAL FINDINGS IN URINE: ICD-10-CM

## 2017-12-22 DIAGNOSIS — Z98.890 OTHER SPECIFIED POSTPROCEDURAL STATES: Chronic | ICD-10-CM

## 2017-12-22 LAB
BUN SERPL-MCNC: 11 MG/DL — SIGNIFICANT CHANGE UP (ref 7–23)
CALCIUM SERPL-MCNC: 9 MG/DL — SIGNIFICANT CHANGE UP (ref 8.4–10.5)
CHLORIDE SERPL-SCNC: 101 MMOL/L — SIGNIFICANT CHANGE UP (ref 98–107)
CO2 SERPL-SCNC: 24 MMOL/L — SIGNIFICANT CHANGE UP (ref 22–31)
CREAT SERPL-MCNC: 0.71 MG/DL — SIGNIFICANT CHANGE UP (ref 0.5–1.3)
GLUCOSE SERPL-MCNC: 187 MG/DL — HIGH (ref 70–99)
HBA1C BLD-MCNC: 7.8 % — HIGH (ref 4–5.6)
HCT VFR BLD CALC: 43.9 % — SIGNIFICANT CHANGE UP (ref 39–50)
HGB BLD-MCNC: 14.4 G/DL — SIGNIFICANT CHANGE UP (ref 13–17)
MCHC RBC-ENTMCNC: 30.6 PG — SIGNIFICANT CHANGE UP (ref 27–34)
MCHC RBC-ENTMCNC: 32.8 % — SIGNIFICANT CHANGE UP (ref 32–36)
MCV RBC AUTO: 93.4 FL — SIGNIFICANT CHANGE UP (ref 80–100)
NRBC # FLD: 0 — SIGNIFICANT CHANGE UP
PLATELET # BLD AUTO: 194 K/UL — SIGNIFICANT CHANGE UP (ref 150–400)
PMV BLD: 10.5 FL — SIGNIFICANT CHANGE UP (ref 7–13)
POTASSIUM SERPL-MCNC: 4 MMOL/L — SIGNIFICANT CHANGE UP (ref 3.5–5.3)
POTASSIUM SERPL-SCNC: 4 MMOL/L — SIGNIFICANT CHANGE UP (ref 3.5–5.3)
RBC # BLD: 4.7 M/UL — SIGNIFICANT CHANGE UP (ref 4.2–5.8)
RBC # FLD: 11.9 % — SIGNIFICANT CHANGE UP (ref 10.3–14.5)
SODIUM SERPL-SCNC: 136 MMOL/L — SIGNIFICANT CHANGE UP (ref 135–145)
WBC # BLD: 5.43 K/UL — SIGNIFICANT CHANGE UP (ref 3.8–10.5)
WBC # FLD AUTO: 5.43 K/UL — SIGNIFICANT CHANGE UP (ref 3.8–10.5)

## 2017-12-22 PROCEDURE — 93010 ELECTROCARDIOGRAM REPORT: CPT

## 2017-12-22 PROCEDURE — 92928 PRQ TCAT PLMT NTRAC ST 1 LES: CPT | Mod: LD,GC

## 2017-12-22 PROCEDURE — 93458 L HRT ARTERY/VENTRICLE ANGIO: CPT | Mod: 26,59,GC

## 2017-12-22 PROCEDURE — 93571 IV DOP VEL&/PRESS C FLO 1ST: CPT | Mod: 26,LD,GC

## 2017-12-22 PROCEDURE — 99152 MOD SED SAME PHYS/QHP 5/>YRS: CPT

## 2017-12-22 RX ORDER — INFLUENZA VIRUS VACCINE 15; 15; 15; 15 UG/.5ML; UG/.5ML; UG/.5ML; UG/.5ML
0.5 SUSPENSION INTRAMUSCULAR ONCE
Qty: 0 | Refills: 0 | Status: DISCONTINUED | OUTPATIENT
Start: 2017-12-22 | End: 2017-12-23

## 2017-12-22 RX ORDER — ASPIRIN/CALCIUM CARB/MAGNESIUM 324 MG
81 TABLET ORAL DAILY
Qty: 0 | Refills: 0 | Status: DISCONTINUED | OUTPATIENT
Start: 2017-12-22 | End: 2017-12-22

## 2017-12-22 RX ORDER — TICAGRELOR 90 MG/1
90 TABLET ORAL
Qty: 0 | Refills: 0 | Status: DISCONTINUED | OUTPATIENT
Start: 2017-12-22 | End: 2017-12-23

## 2017-12-22 RX ORDER — ASPIRIN/CALCIUM CARB/MAGNESIUM 324 MG
81 TABLET ORAL DAILY
Qty: 0 | Refills: 0 | Status: DISCONTINUED | OUTPATIENT
Start: 2017-12-23 | End: 2017-12-23

## 2017-12-22 RX ORDER — LISINOPRIL 2.5 MG/1
5 TABLET ORAL DAILY
Qty: 0 | Refills: 0 | Status: DISCONTINUED | OUTPATIENT
Start: 2017-12-22 | End: 2017-12-23

## 2017-12-22 RX ORDER — ISOSORBIDE MONONITRATE 60 MG/1
30 TABLET, EXTENDED RELEASE ORAL DAILY
Qty: 0 | Refills: 0 | Status: DISCONTINUED | OUTPATIENT
Start: 2017-12-22 | End: 2017-12-23

## 2017-12-22 RX ORDER — ATORVASTATIN CALCIUM 80 MG/1
80 TABLET, FILM COATED ORAL AT BEDTIME
Qty: 0 | Refills: 0 | Status: DISCONTINUED | OUTPATIENT
Start: 2017-12-22 | End: 2017-12-23

## 2017-12-22 RX ORDER — CARVEDILOL PHOSPHATE 80 MG/1
3.12 CAPSULE, EXTENDED RELEASE ORAL EVERY 12 HOURS
Qty: 0 | Refills: 0 | Status: DISCONTINUED | OUTPATIENT
Start: 2017-12-22 | End: 2017-12-23

## 2017-12-22 RX ADMIN — TICAGRELOR 90 MILLIGRAM(S): 90 TABLET ORAL at 18:07

## 2017-12-22 RX ADMIN — ISOSORBIDE MONONITRATE 30 MILLIGRAM(S): 60 TABLET, EXTENDED RELEASE ORAL at 18:07

## 2017-12-22 RX ADMIN — ATORVASTATIN CALCIUM 80 MILLIGRAM(S): 80 TABLET, FILM COATED ORAL at 22:15

## 2017-12-22 RX ADMIN — CARVEDILOL PHOSPHATE 3.12 MILLIGRAM(S): 80 CAPSULE, EXTENDED RELEASE ORAL at 18:07

## 2017-12-22 RX ADMIN — LISINOPRIL 5 MILLIGRAM(S): 2.5 TABLET ORAL at 18:07

## 2017-12-22 RX ADMIN — Medication 200 MILLIGRAM(S): at 22:15

## 2017-12-22 NOTE — DISCHARGE NOTE ADULT - HOSPITAL COURSE
57 year old male with history of CAD s/p anterior wall STEMI (10/2016 pLAD, dLAD), hypertension, hyperlipidemia, BPH, Mild-moderate LV dysfunction presents to [facility] for cardiac catheterization after experiencing recent chest pain and palpitations.    Cath : A successful balloon angioplasty with stent was performed on the 60 % lesion in the mid LAD. Following intervention there was a 1 % residual stenosis.  DIAGNOSTIC RECOMMENDATIONS: PCI of mid LAD lesion for treatment of unstable angina.  Continue aspirin, 81 mg, PO, daily.  Continue ticagrelor 90 mg twice daily. Patient management should include aggressive medical therapy. 57 year old male with history of CAD s/p anterior wall STEMI (10/2016 pLAD, dLAD), hypertension, hyperlipidemia, BPH, Mild-moderate LV dysfunction presents to [facility] for cardiac catheterization after experiencing recent chest pain and palpitations.    Cath : A successful balloon angioplasty with stent was performed on the 60 % lesion in the mid LAD. Following intervention there was a 1 % residual stenosis.  DIAGNOSTIC RECOMMENDATIONS: PCI of mid LAD lesion for treatment of unstable angina.  Continue aspirin, 81 mg, PO, daily.  Continue ticagrelor 90 mg twice daily. Patient management should include aggressive medical therapy.  12/23- As per attending, patient stable for discharge.

## 2017-12-22 NOTE — H&P CARDIOLOGY - FAMILY HISTORY
No pertinent family history in first degree relatives Father  Still living? Unknown  Family history of hypertension, Age at diagnosis: Age Unknown     Mother  Still living? Unknown  Family history of hypertension, Age at diagnosis: Age Unknown     Sibling  Still living? Unknown  Family history of hypertension, Age at diagnosis: Age Unknown

## 2017-12-22 NOTE — H&P CARDIOLOGY - RS GEN PE MLT RESP DETAILS PC
no rhonchi/respirations non-labored/airway patent/normal/breath sounds equal/no subcutaneous emphysema/no wheezes/clear to auscultation bilaterally/good air movement

## 2017-12-22 NOTE — H&P CARDIOLOGY - PMH
HTN (hypertension)    Hyperlipidemia    MI, old HTN (hypertension)    Hyperlipidemia    Ischemic cardiomyopathy    MI, old    Stented coronary artery

## 2017-12-22 NOTE — H&P CARDIOLOGY - HISTORY OF PRESENT ILLNESS
57 year old male with history of CAD s/p anterior wall STEMI (10/2016 pLAD, dLAD), hypertension, hyperlipidemia, BPH, Mild-moderate LV dysfunction presents to [facility] for cardiac catheterization. Patient has complaints of intermittent chest pain and palpitations over the past few days that are unaffected by activity. Patient is currently sitting comfortably in bed and denied SOB, HA, dizziness, N/V/D/C, abdominal pain, and diaphoresis at this time. Due to the patients significant medical history and recent onset of symptoms there is a high risk for obstructive CAD. Patient was referred by Dr. Ellison to  [facility] for cardiac catheterization.

## 2017-12-22 NOTE — DISCHARGE NOTE ADULT - CARE PLAN
Principal Discharge DX:	CAD S/P percutaneous coronary angioplasty  Goal:	To prevent chest pain, heart attack and worsening coronary artery disease  Instructions for follow-up, activity and diet:	Continue aspirin and Brilinta, do not stop unless instructed by physician.  Follow up with cardiologist  Continue low cholesterol diet  Secondary Diagnosis:	HTN (hypertension)  Goal:	To maintain a normal blood pressure to prevent heart attack, stroke and renal failure  Instructions for follow-up, activity and diet:	Home blood pressure monitoring.  Low sodium diet, Follow up with primary care physician.  Continue anti-hypertensive medications  Secondary Diagnosis:	Hyperlipidemia  Goal:	To maintain normal cholesterol levels to prevent stroke and heart attack  Instructions for follow-up, activity and diet:	Low fat diet, Continue current medications. Follow up with primary care physician and cardiologist

## 2017-12-22 NOTE — DISCHARGE NOTE ADULT - ADDITIONAL INSTRUCTIONS
Follow up with Cardiologist and PMD within one week of discharge. Call for appointment. Return to ED for any concerning symptoms. Continue medications as prescribed. Low salt, low fat, low cholesterol diet. No heavy lifting greater than 5-10 pounds with right wrist x one week. No strenuous activity x 3 weeks. Monitor site of procedure and notify your doctor for any redness, swelling, discharge No heavy lifting x one week. No strenuous activity x 3 weeks. Monitor site of procedure and notify your doctor for any redness, swelling, discharge. No driving x 24 hours. You may shower but no baths or swimming x one week.

## 2017-12-22 NOTE — DISCHARGE NOTE ADULT - PLAN OF CARE
To prevent chest pain, heart attack and worsening coronary artery disease Continue aspirin and Brilinta, do not stop unless instructed by physician.  Follow up with cardiologist  Continue low cholesterol diet To maintain a normal blood pressure to prevent heart attack, stroke and renal failure Home blood pressure monitoring.  Low sodium diet, Follow up with primary care physician.  Continue anti-hypertensive medications To maintain normal cholesterol levels to prevent stroke and heart attack Low fat diet, Continue current medications. Follow up with primary care physician and cardiologist

## 2017-12-22 NOTE — DISCHARGE NOTE ADULT - CARE PROVIDER_API CALL
Sidney Ellison), Cardiovascular Disease; Internal Medicine; Interventional Cardiology  [address] [address] [place] [address]  [place] [address]  Phone: [phone]  Fax: [phone]

## 2017-12-22 NOTE — CHART NOTE - NSCHARTNOTEFT_GEN_A_CORE
Status post Cath, Right Radial and femoral sites without bleeding or hematoma.  Dressing is intact.  Positive Pulses, Capillary refill less than two seconds.  Will continue to monitor.                                                                                                                                                  ALBERT Rios, RPA-C [id number]

## 2017-12-22 NOTE — DISCHARGE NOTE ADULT - PATIENT PORTAL LINK FT
“You can access the [employer] Patient Portal, offered by [facility], by registering with the following website: [email or web address]”

## 2017-12-22 NOTE — DISCHARGE NOTE ADULT - NS AS ACTIVITY OBS
No heavy lifting x one week. No strenuous activity x 3 weeks. Monitor site of procedure and notify your doctor for any redness, swelling, discharge. No driving x 24 hours. You may shower but no baths or swimming x one week.

## 2017-12-22 NOTE — PATIENT PROFILE ADULT. - VISION (WITH CORRECTIVE LENSES IF THE PATIENT USUALLY WEARS THEM):
Normal vision: sees adequately in most situations; can see medication labels, newsprint/wears glasses to read fine print

## 2017-12-22 NOTE — DISCHARGE NOTE ADULT - MEDICATION SUMMARY - MEDICATIONS TO TAKE
I will START or STAY ON the medications listed below when I get home from the hospital:    aspirin 81 mg oral delayed release tablet  -- 1 tab(s) by mouth once a day  -- Indication: For Stented coronary artery    lisinopril 5 mg oral tablet  -- 1 tab(s) by mouth once a day  -- Indication: For HTN     isosorbide mononitrate 30 mg oral tablet, extended release  -- 1 tab(s) by mouth once a day  -- Indication: For Chest Pain     atorvastatin 80 mg oral tablet  -- 1 tab(s) by mouth once a day (at bedtime)  -- Indication: For Hyperlididemia     ticagrelor 90 mg oral tablet  -- 1 tab(s) by mouth 2 times a day  -- Indication: For CAD     carvedilol 3.125 mg oral tablet  -- 1 tab(s) by mouth every 12 hours  -- Indication: For HTN     guaiFENesin 100 mg/5 mL oral liquid  -- 10 milliliter(s) by mouth every 6 hours, As needed, Cough  -- Indication: For Cough

## 2017-12-23 VITALS
DIASTOLIC BLOOD PRESSURE: 82 MMHG | TEMPERATURE: 99 F | HEART RATE: 84 BPM | OXYGEN SATURATION: 96 % | SYSTOLIC BLOOD PRESSURE: 136 MMHG | RESPIRATION RATE: 18 BRPM

## 2017-12-23 LAB
BUN SERPL-MCNC: 14 MG/DL — SIGNIFICANT CHANGE UP (ref 7–23)
CALCIUM SERPL-MCNC: 8.8 MG/DL — SIGNIFICANT CHANGE UP (ref 8.4–10.5)
CHLORIDE SERPL-SCNC: 101 MMOL/L — SIGNIFICANT CHANGE UP (ref 98–107)
CO2 SERPL-SCNC: 22 MMOL/L — SIGNIFICANT CHANGE UP (ref 22–31)
CREAT SERPL-MCNC: 0.8 MG/DL — SIGNIFICANT CHANGE UP (ref 0.5–1.3)
GLUCOSE SERPL-MCNC: 339 MG/DL — HIGH (ref 70–99)
HCT VFR BLD CALC: 42.7 % — SIGNIFICANT CHANGE UP (ref 39–50)
HGB BLD-MCNC: 14 G/DL — SIGNIFICANT CHANGE UP (ref 13–17)
MAGNESIUM SERPL-MCNC: 2.1 MG/DL — SIGNIFICANT CHANGE UP (ref 1.6–2.6)
MCHC RBC-ENTMCNC: 30.7 PG — SIGNIFICANT CHANGE UP (ref 27–34)
MCHC RBC-ENTMCNC: 32.8 % — SIGNIFICANT CHANGE UP (ref 32–36)
MCV RBC AUTO: 93.6 FL — SIGNIFICANT CHANGE UP (ref 80–100)
NRBC # FLD: 0 — SIGNIFICANT CHANGE UP
PLATELET # BLD AUTO: 155 K/UL — SIGNIFICANT CHANGE UP (ref 150–400)
PMV BLD: 10.7 FL — SIGNIFICANT CHANGE UP (ref 7–13)
POTASSIUM SERPL-MCNC: 4.3 MMOL/L — SIGNIFICANT CHANGE UP (ref 3.5–5.3)
POTASSIUM SERPL-SCNC: 4.3 MMOL/L — SIGNIFICANT CHANGE UP (ref 3.5–5.3)
RBC # BLD: 4.56 M/UL — SIGNIFICANT CHANGE UP (ref 4.2–5.8)
RBC # FLD: 12.1 % — SIGNIFICANT CHANGE UP (ref 10.3–14.5)
SODIUM SERPL-SCNC: 135 MMOL/L — SIGNIFICANT CHANGE UP (ref 135–145)
WBC # BLD: 5.19 K/UL — SIGNIFICANT CHANGE UP (ref 3.8–10.5)
WBC # FLD AUTO: 5.19 K/UL — SIGNIFICANT CHANGE UP (ref 3.8–10.5)

## 2017-12-23 PROCEDURE — 99232 SBSQ HOSP IP/OBS MODERATE 35: CPT | Mod: GC

## 2017-12-23 RX ADMIN — TICAGRELOR 90 MILLIGRAM(S): 90 TABLET ORAL at 05:51

## 2017-12-23 RX ADMIN — LISINOPRIL 5 MILLIGRAM(S): 2.5 TABLET ORAL at 05:51

## 2017-12-23 RX ADMIN — CARVEDILOL PHOSPHATE 3.12 MILLIGRAM(S): 80 CAPSULE, EXTENDED RELEASE ORAL at 05:51

## 2017-12-23 NOTE — PROGRESS NOTE ADULT - ASSESSMENT
57M s/p PCI feeling well.      1. Cont DAPT, beta blocker, high intensity statin, ACE-I and imdur  2. Return to work letter given (with restricted heavy lifting x 2 weeks at appointment with Dr. Ellison < 25lbs)  3. D/C home

## 2017-12-23 NOTE — PROGRESS NOTE ADULT - SUBJECTIVE AND OBJECTIVE BOX
HPI:  57 year old male with history of CAD s/p anterior wall STEMI (10/2016 pLAD, dLAD), hypertension, hyperlipidemia, BPH, Mild-moderate LV dysfunction presents to [facility] for cardiac catheterization. Patient has complaints of intermittent chest pain and palpitations over the past few days that are unaffected by activity. Patient is currently sitting comfortably in bed and denied SOB, HA, dizziness, N/V/D/C, abdominal pain, and diaphoresis at this time. Due to the patients significant medical history and recent onset of symptoms there is a high risk for obstructive CAD. Patient was referred by Dr. Ellison to  [facility] for cardiac catheterization. (22 Dec 2017 09:32)    Feeling well today.  No access site pain (both RRA and RFA).  No chest pain, palpitations or SOB.    Review Of Systems:  Constitutional: [ ] Fever [ ] Chills [ ] Fatigue [ ] Weight change   HEENT: [ ] Blurred vision [ ] Eye Pain [ ] Headache [ ] Runny nose [ ] Sore Throat   Respiratory: [ ] Cough [ ] Wheezing [ ] Shortness of breath  Cardiovascular: [ ] Chest Pain [ ] Palpitations [ ] MURILLO [ ] PND [ ] Orthopnea  Gastrointestinal: [ ] Abdominal Pain [ ] Diarrhea [ ] Constipation [ ] Hemorrhoids [ ] Nausea [ ] Vomiting  Genitourinary: [ ] Nocturia [ ] Dysuria [ ] Incontinence  Extremities: [ ] Swelling [ ] Joint Pain  Neurologic: [ ] Focal deficit [ ] Paresthesias [ ] Syncope  Lymphatic: [ ] Swelling [ ] Lymphadenopathy   Skin: [ ] Rash [ ] Ecchymoses [ ] Wounds [ ] Lesions  Psychiatry: [ ] Depression [ ] Suicidal/Homicidal Ideation [ ] Anxiety [ ] Sleep Disturbances  [x] 10 point review of systems is otherwise negative except as mentioned above            [ ]Unable to obtain    Medications:  aspirin enteric coated 81 milliGRAM(s) Oral daily  atorvastatin 80 milliGRAM(s) Oral at bedtime  carvedilol 3.125 milliGRAM(s) Oral every 12 hours  guaiFENesin   Syrup  (Sugar-Free) 200 milliGRAM(s) Oral every 6 hours PRN  influenza   Vaccine 0.5 milliLiter(s) IntraMuscular once  isosorbide   mononitrate ER Tablet (IMDUR) 30 milliGRAM(s) Oral daily  lisinopril 5 milliGRAM(s) Oral daily  ticagrelor 90 milliGRAM(s) Oral two times a day    PMH/PSH/FH/SH: [x] Unchanged    Vitals:  T(C): 37.2 (12-23-17 @ 05:50), Max: 37.2 (12-23-17 @ 05:50)  HR: 84 (12-23-17 @ 05:50) (78 - 84)  BP: 136/82 (12-23-17 @ 05:50) (136/82 - 140/76)  BP(mean): --  RR: 18 (12-23-17 @ 05:50) (18 - 18)  SpO2: 96% (12-23-17 @ 05:50) (96% - 97%)    Physical Exam:  Appearance:  Normal, NAD  Eyes: PERRL, EOMI  HENT: Normal oral [place] NC/AT  Cardiovascular: S1, S2, RRR, No m/r/g appreciated, No edema, no elevation in JVP  Procedural Access Site: No hematoma, Non-tender to palpation, 2+ pulse , No bruit, No Ecchymosis  Respiratory: Clear to auscultation bilaterally  Gastrointestinal: Soft, Non-tender, Non-distended, BS+  Musculoskeletal:  No clubbing, No joint deformity   Psychiatry: AAOx3, Mood & affect appropriate  Skin: No rashes, No ecchymoses, No cyanosis    Labs:                        14.0   5.19  )-----------( 155      ( 23 Dec 2017 07:10 )             42.7     12-23    135  |  101  |  14  ----------------------------<  339<H>  4.3   |  22  |  0.80    Ca    8.8      23 Dec 2017 07:10  Mg     2.1     12-23    Interpretation of Telemetry:  No events

## 2018-02-06 PROBLEM — I25.5 ISCHEMIC CARDIOMYOPATHY: Chronic | Status: ACTIVE | Noted: 2017-12-22

## 2018-02-06 PROBLEM — Z95.5 PRESENCE OF CORONARY ANGIOPLASTY IMPLANT AND GRAFT: Chronic | Status: ACTIVE | Noted: 2017-12-22

## 2018-02-14 ENCOUNTER — NON-APPOINTMENT (OUTPATIENT)
Age: 58
End: 2018-02-14

## 2018-02-14 ENCOUNTER — APPOINTMENT (OUTPATIENT)
Dept: CARDIOLOGY | Facility: CLINIC | Age: 58
End: 2018-02-14
Payer: COMMERCIAL

## 2018-02-14 VITALS
DIASTOLIC BLOOD PRESSURE: 92 MMHG | HEART RATE: 86 BPM | OXYGEN SATURATION: 94 % | WEIGHT: 208 LBS | HEIGHT: 69 IN | RESPIRATION RATE: 14 BRPM | SYSTOLIC BLOOD PRESSURE: 140 MMHG | BODY MASS INDEX: 30.81 KG/M2

## 2018-02-14 VITALS — OXYGEN SATURATION: 95 % | RESPIRATION RATE: 14 BRPM

## 2018-02-14 DIAGNOSIS — Z12.11 ENCOUNTER FOR SCREENING FOR MALIGNANT NEOPLASM OF COLON: ICD-10-CM

## 2018-02-14 PROCEDURE — 99214 OFFICE O/P EST MOD 30 MIN: CPT

## 2018-02-14 PROCEDURE — 93000 ELECTROCARDIOGRAM COMPLETE: CPT

## 2018-02-14 RX ORDER — ZOLPIDEM TARTRATE 10 MG/1
10 TABLET ORAL
Qty: 30 | Refills: 0 | Status: ACTIVE | COMMUNITY
Start: 2017-12-29

## 2018-02-21 PROBLEM — Z12.11 ENCOUNTER FOR SCREENING COLONOSCOPY: Status: RESOLVED | Noted: 2017-07-10 | Resolved: 2018-02-21

## 2018-04-14 ENCOUNTER — EMERGENCY (EMERGENCY)
Facility: HOSPITAL | Age: 58
LOS: 1 days | Discharge: HOME | End: 2018-04-14
Attending: EMERGENCY MEDICINE | Admitting: EMERGENCY MEDICINE
Payer: COMMERCIAL

## 2018-04-14 VITALS
HEART RATE: 80 BPM | SYSTOLIC BLOOD PRESSURE: 157 MMHG | OXYGEN SATURATION: 98 % | DIASTOLIC BLOOD PRESSURE: 90 MMHG | TEMPERATURE: 98 F | RESPIRATION RATE: 16 BRPM

## 2018-04-14 VITALS
OXYGEN SATURATION: 97 % | DIASTOLIC BLOOD PRESSURE: 88 MMHG | TEMPERATURE: 99 F | SYSTOLIC BLOOD PRESSURE: 156 MMHG | HEART RATE: 95 BPM | RESPIRATION RATE: 16 BRPM

## 2018-04-14 DIAGNOSIS — Z98.890 OTHER SPECIFIED POSTPROCEDURAL STATES: Chronic | ICD-10-CM

## 2018-04-14 LAB
ALBUMIN SERPL ELPH-MCNC: 4.2 G/DL — SIGNIFICANT CHANGE UP (ref 3.3–5)
ALP SERPL-CCNC: 89 U/L — SIGNIFICANT CHANGE UP (ref 40–120)
ALT FLD-CCNC: 22 U/L — SIGNIFICANT CHANGE UP (ref 4–41)
APPEARANCE UR: CLEAR — SIGNIFICANT CHANGE UP
AST SERPL-CCNC: 11 U/L — SIGNIFICANT CHANGE UP (ref 4–40)
BASE EXCESS BLDV CALC-SCNC: 0.8 MMOL/L — SIGNIFICANT CHANGE UP
BASOPHILS # BLD AUTO: 0.06 K/UL — SIGNIFICANT CHANGE UP (ref 0–0.2)
BASOPHILS NFR BLD AUTO: 0.5 % — SIGNIFICANT CHANGE UP (ref 0–2)
BILIRUB SERPL-MCNC: 0.3 MG/DL — SIGNIFICANT CHANGE UP (ref 0.2–1.2)
BILIRUB UR-MCNC: NEGATIVE — SIGNIFICANT CHANGE UP
BLOOD GAS VENOUS - CREATININE: 0.77 MG/DL — SIGNIFICANT CHANGE UP (ref 0.5–1.3)
BLOOD UR QL VISUAL: NEGATIVE — SIGNIFICANT CHANGE UP
BUN SERPL-MCNC: 14 MG/DL — SIGNIFICANT CHANGE UP (ref 7–23)
CALCIUM SERPL-MCNC: 9.1 MG/DL — SIGNIFICANT CHANGE UP (ref 8.4–10.5)
CHLORIDE BLDV-SCNC: 107 MMOL/L — SIGNIFICANT CHANGE UP (ref 96–108)
CHLORIDE SERPL-SCNC: 100 MMOL/L — SIGNIFICANT CHANGE UP (ref 98–107)
CO2 SERPL-SCNC: 21 MMOL/L — LOW (ref 22–31)
COLOR SPEC: YELLOW — SIGNIFICANT CHANGE UP
CREAT SERPL-MCNC: 0.79 MG/DL — SIGNIFICANT CHANGE UP (ref 0.5–1.3)
EOSINOPHIL # BLD AUTO: 0.48 K/UL — SIGNIFICANT CHANGE UP (ref 0–0.5)
EOSINOPHIL NFR BLD AUTO: 4.1 % — SIGNIFICANT CHANGE UP (ref 0–6)
GAS PNL BLDV: 131 MMOL/L — LOW (ref 136–146)
GLUCOSE BLDV-MCNC: 176 — HIGH (ref 70–99)
GLUCOSE SERPL-MCNC: 173 MG/DL — HIGH (ref 70–99)
GLUCOSE UR-MCNC: SIGNIFICANT CHANGE UP
HCO3 BLDV-SCNC: 25 MMOL/L — SIGNIFICANT CHANGE UP (ref 20–27)
HCT VFR BLD CALC: 43.4 % — SIGNIFICANT CHANGE UP (ref 39–50)
HCT VFR BLDV CALC: 45.6 % — SIGNIFICANT CHANGE UP (ref 39–51)
HGB BLD-MCNC: 14.6 G/DL — SIGNIFICANT CHANGE UP (ref 13–17)
HGB BLDV-MCNC: 14.9 G/DL — SIGNIFICANT CHANGE UP (ref 13–17)
IMM GRANULOCYTES # BLD AUTO: 0.07 # — SIGNIFICANT CHANGE UP
IMM GRANULOCYTES NFR BLD AUTO: 0.6 % — SIGNIFICANT CHANGE UP (ref 0–1.5)
KETONES UR-MCNC: SIGNIFICANT CHANGE UP
LACTATE BLDV-MCNC: 1 MMOL/L — SIGNIFICANT CHANGE UP (ref 0.5–2)
LEUKOCYTE ESTERASE UR-ACNC: NEGATIVE — SIGNIFICANT CHANGE UP
LIDOCAIN IGE QN: 66 U/L — HIGH (ref 7–60)
LYMPHOCYTES # BLD AUTO: 1.78 K/UL — SIGNIFICANT CHANGE UP (ref 1–3.3)
LYMPHOCYTES # BLD AUTO: 15.1 % — SIGNIFICANT CHANGE UP (ref 13–44)
MAGNESIUM SERPL-MCNC: 2.1 MG/DL — SIGNIFICANT CHANGE UP (ref 1.6–2.6)
MCHC RBC-ENTMCNC: 31.1 PG — SIGNIFICANT CHANGE UP (ref 27–34)
MCHC RBC-ENTMCNC: 33.6 % — SIGNIFICANT CHANGE UP (ref 32–36)
MCV RBC AUTO: 92.5 FL — SIGNIFICANT CHANGE UP (ref 80–100)
MONOCYTES # BLD AUTO: 0.76 K/UL — SIGNIFICANT CHANGE UP (ref 0–0.9)
MONOCYTES NFR BLD AUTO: 6.4 % — SIGNIFICANT CHANGE UP (ref 2–14)
MUCOUS THREADS # UR AUTO: SIGNIFICANT CHANGE UP
NEUTROPHILS # BLD AUTO: 8.67 K/UL — HIGH (ref 1.8–7.4)
NEUTROPHILS NFR BLD AUTO: 73.3 % — SIGNIFICANT CHANGE UP (ref 43–77)
NITRITE UR-MCNC: NEGATIVE — SIGNIFICANT CHANGE UP
NRBC # FLD: 0 — SIGNIFICANT CHANGE UP
PCO2 BLDV: 40 MMHG — LOW (ref 41–51)
PH BLDV: 7.41 PH — SIGNIFICANT CHANGE UP (ref 7.32–7.43)
PH UR: 6 — SIGNIFICANT CHANGE UP (ref 4.6–8)
PHOSPHATE SERPL-MCNC: 3.5 MG/DL — SIGNIFICANT CHANGE UP (ref 2.5–4.5)
PLATELET # BLD AUTO: 205 K/UL — SIGNIFICANT CHANGE UP (ref 150–400)
PMV BLD: 10.4 FL — SIGNIFICANT CHANGE UP (ref 7–13)
PO2 BLDV: 41 MMHG — HIGH (ref 35–40)
POTASSIUM BLDV-SCNC: 3.6 MMOL/L — SIGNIFICANT CHANGE UP (ref 3.4–4.5)
POTASSIUM SERPL-MCNC: 3.8 MMOL/L — SIGNIFICANT CHANGE UP (ref 3.5–5.3)
POTASSIUM SERPL-SCNC: 3.8 MMOL/L — SIGNIFICANT CHANGE UP (ref 3.5–5.3)
PROT SERPL-MCNC: 7 G/DL — SIGNIFICANT CHANGE UP (ref 6–8.3)
PROT UR-MCNC: 20 MG/DL — SIGNIFICANT CHANGE UP
RBC # BLD: 4.69 M/UL — SIGNIFICANT CHANGE UP (ref 4.2–5.8)
RBC # FLD: 12.1 % — SIGNIFICANT CHANGE UP (ref 10.3–14.5)
RBC CASTS # UR COMP ASSIST: SIGNIFICANT CHANGE UP (ref 0–?)
SAO2 % BLDV: 75.2 % — SIGNIFICANT CHANGE UP (ref 60–85)
SODIUM SERPL-SCNC: 137 MMOL/L — SIGNIFICANT CHANGE UP (ref 135–145)
SP GR SPEC: 1.03 — SIGNIFICANT CHANGE UP (ref 1–1.04)
UROBILINOGEN FLD QL: 1 MG/DL — SIGNIFICANT CHANGE UP
WBC # BLD: 11.82 K/UL — HIGH (ref 3.8–10.5)
WBC # FLD AUTO: 11.82 K/UL — HIGH (ref 3.8–10.5)
WBC UR QL: SIGNIFICANT CHANGE UP (ref 0–?)

## 2018-04-14 PROCEDURE — 74177 CT ABD & PELVIS W/CONTRAST: CPT | Mod: 26

## 2018-04-14 PROCEDURE — 99284 EMERGENCY DEPT VISIT MOD MDM: CPT

## 2018-04-14 RX ORDER — MORPHINE SULFATE 50 MG/1
4 CAPSULE, EXTENDED RELEASE ORAL ONCE
Qty: 0 | Refills: 0 | Status: DISCONTINUED | OUTPATIENT
Start: 2018-04-14 | End: 2018-04-14

## 2018-04-14 RX ORDER — SODIUM CHLORIDE 9 MG/ML
1000 INJECTION INTRAMUSCULAR; INTRAVENOUS; SUBCUTANEOUS ONCE
Qty: 0 | Refills: 0 | Status: COMPLETED | OUTPATIENT
Start: 2018-04-14 | End: 2018-04-14

## 2018-04-14 RX ORDER — KETOROLAC TROMETHAMINE 30 MG/ML
15 SYRINGE (ML) INJECTION ONCE
Qty: 0 | Refills: 0 | Status: DISCONTINUED | OUTPATIENT
Start: 2018-04-14 | End: 2018-04-14

## 2018-04-14 RX ADMIN — Medication 15 MILLIGRAM(S): at 14:41

## 2018-04-14 RX ADMIN — MORPHINE SULFATE 4 MILLIGRAM(S): 50 CAPSULE, EXTENDED RELEASE ORAL at 16:11

## 2018-04-14 RX ADMIN — MORPHINE SULFATE 4 MILLIGRAM(S): 50 CAPSULE, EXTENDED RELEASE ORAL at 17:57

## 2018-04-14 RX ADMIN — Medication 15 MILLIGRAM(S): at 17:58

## 2018-04-14 RX ADMIN — SODIUM CHLORIDE 1000 MILLILITER(S): 9 INJECTION INTRAMUSCULAR; INTRAVENOUS; SUBCUTANEOUS at 14:41

## 2018-04-14 NOTE — ED PROVIDER NOTE - CONDUCTED A DETAILED DISCUSSION WITH PATIENT AND/OR GUARDIAN REGARDING, MDM
lab results/radiology results lab results/return to ED if symptoms worsen, persist or questions arise/radiology results/need for outpatient follow-up

## 2018-04-14 NOTE — ED PROVIDER NOTE - PROGRESS NOTE DETAILS
Ordonez: pt lab shows non-specific wbc.  minimally elevated lipase.  lactate1.  pt ua clean.  ct shows no acute path. tolerating po.  pt states toradol help his abd pain.  instruct pt to monitor sx and slowly advance diet.  return if sx worsens.  dx abd pain nos.  f/u with pcp.  motrin prn. left ambulatory.  return precautions given.

## 2018-04-14 NOTE — ED PROVIDER NOTE - GASTROINTESTINAL, MLM
Abdomen soft, periumbilical tender, no guarding. neg rovsing, neg psoas, neg obturator, no mass or hernia

## 2018-04-14 NOTE — ED PROVIDER NOTE - MEDICAL DECISION MAKING DETAILS
57 yr old male with hx of HTN, HLD, CAD x3 stent, active smoker presents to ed c/o generalized abd burning sensation.  Mild nausea.  no fever, no chills, no visual changes, no headache, no numbness or tingling, no sob, no cough, no cp, no palpitations, no leg swelling, no syncope, no n/v/d, no dysuria, no rashes.  pt last ate 1130a.     pt with abd pain and active smoker possibly due to abd cramping from hypoperfusion vs gastroenteritis vs appy vs colitis. labs, fluids, pain meds, ct

## 2018-04-14 NOTE — ED PROVIDER NOTE - OBJECTIVE STATEMENT
57 yr old male with hx of HTN, HLD, CAD x3 stent, active smoker presents to ed c/o generalized abd burning sensation.  Mild nausea.  no fever, no chills, no visual changes, no headache, no numbness or tingling, no sob, no cough, no cp, no palpitations, no leg swelling, no syncope, no n/v/d, no dysuria, no rashes.  pt last ate 1130a.

## 2018-04-14 NOTE — ED ADULT NURSE NOTE - PMH
HTN (hypertension)    Hyperlipidemia    Ischemic cardiomyopathy    MI, old    Stented coronary artery

## 2018-04-14 NOTE — ED ADULT NURSE NOTE - OBJECTIVE STATEMENT
Patient received to intake 5, a&ox3, c/o abdominal pain x 1-2 days, denies any n/v/d, chest pain. Labs drawn and sent IVL placed. Pain med given as ordered. Awaiting CT.

## 2018-04-16 LAB
BACTERIA UR CULT: SIGNIFICANT CHANGE UP
SPECIMEN SOURCE: SIGNIFICANT CHANGE UP

## 2018-05-02 ENCOUNTER — RX RENEWAL (OUTPATIENT)
Age: 58
End: 2018-05-02

## 2018-05-02 ENCOUNTER — MEDICATION RENEWAL (OUTPATIENT)
Age: 58
End: 2018-05-02

## 2018-06-13 ENCOUNTER — NON-APPOINTMENT (OUTPATIENT)
Age: 58
End: 2018-06-13

## 2018-06-13 ENCOUNTER — APPOINTMENT (OUTPATIENT)
Dept: CARDIOLOGY | Facility: CLINIC | Age: 58
End: 2018-06-13
Payer: COMMERCIAL

## 2018-06-13 VITALS
HEIGHT: 70 IN | SYSTOLIC BLOOD PRESSURE: 122 MMHG | WEIGHT: 204 LBS | DIASTOLIC BLOOD PRESSURE: 83 MMHG | HEART RATE: 83 BPM | RESPIRATION RATE: 14 BRPM | BODY MASS INDEX: 29.2 KG/M2 | OXYGEN SATURATION: 94 %

## 2018-06-13 PROCEDURE — 99214 OFFICE O/P EST MOD 30 MIN: CPT

## 2018-06-13 PROCEDURE — 93000 ELECTROCARDIOGRAM COMPLETE: CPT

## 2018-09-06 NOTE — PROGRESS NOTE ADULT - ATTENDING COMMENTS
Note from today's visit faxed to [phone] Attn: Hayley Prasad    Luis Nash is a 57 year old man with history of CAD s/p anterior wall STEMI (10/2016 pLAD, dLAD and mild to moderate LV dysfunction), hypertension, hyperlipidemia and BPH. He presented to [facility] with intermittent chest pain and palpitations over the several days, not associated with physical activity. He was referred by Dr. Ellison to [facility] for cardiac catheterization. Coronary angiography done 12/22/17 showed 20 % stenosis of the proximal LAD at the site of a prior stent;  20 % stenosis of mid LAD at the site of a prior stent, and  a discrete 60 % stenosis in the distal third of the vessel with VASILIY grade 3 flow There was a tubular 40 % stenosis of the mid section of the LCX. There was also a tubular 30 % stenosis in the proximal third ofOM1. There were discrete 20% stenoses of the mid and distal RCA. The patient underwent  successful balloon angioplasty with stent on the 60 % lesion in the mid LAD. Post procedure (and discharge) medical regimen to include: EC aspirin 81 mg daily, atorvastatin (Lipitor) 80 mg daily at bedtime, carvedilol (Coreg) 3.125 mg every 12 hours, isosorbide mononitrate ER Tablet (Imdur) 30 mg daily, lisinopril 5 mg daily and ticagrelor (Brilinta) 90 mg twice daily

## 2018-09-17 NOTE — H&P ADULT - RS GEN HX ROS MEA POS PC
Farrah was dog sitting this weekend and she said she didn't have to take her Ativan once this weekend, it seemed to help her OCD, Depression and Anxiety. NO panic attacks or anything, her landlord says it would be ok to have a dog but needs a letter from you stating  That the dog is an emotional support animal, she found a small dog that would be appropriate. She would much rather have the animal than relying on medication. Thank you! Please let us know asap, so she can purchase the dog today. cough/dyspnea

## 2018-10-17 ENCOUNTER — APPOINTMENT (OUTPATIENT)
Dept: CARDIOLOGY | Facility: CLINIC | Age: 58
End: 2018-10-17
Payer: COMMERCIAL

## 2018-10-17 ENCOUNTER — NON-APPOINTMENT (OUTPATIENT)
Age: 58
End: 2018-10-17

## 2018-10-17 VITALS
HEART RATE: 86 BPM | HEIGHT: 70 IN | BODY MASS INDEX: 31.5 KG/M2 | DIASTOLIC BLOOD PRESSURE: 75 MMHG | WEIGHT: 220 LBS | SYSTOLIC BLOOD PRESSURE: 118 MMHG | OXYGEN SATURATION: 99 %

## 2018-10-17 PROBLEM — I25.2 OLD MYOCARDIAL INFARCTION: Chronic | Status: ACTIVE | Noted: 2017-03-16

## 2018-10-17 PROBLEM — E78.5 HYPERLIPIDEMIA, UNSPECIFIED: Chronic | Status: ACTIVE | Noted: 2017-06-01

## 2018-10-17 PROCEDURE — 93000 ELECTROCARDIOGRAM COMPLETE: CPT

## 2018-10-17 PROCEDURE — 99214 OFFICE O/P EST MOD 30 MIN: CPT

## 2018-11-05 ENCOUNTER — RX RENEWAL (OUTPATIENT)
Age: 58
End: 2018-11-05

## 2018-11-08 ENCOUNTER — RX RENEWAL (OUTPATIENT)
Age: 58
End: 2018-11-08

## 2018-11-12 ENCOUNTER — RX RENEWAL (OUTPATIENT)
Age: 58
End: 2018-11-12

## 2018-11-12 ENCOUNTER — MEDICATION RENEWAL (OUTPATIENT)
Age: 58
End: 2018-11-12

## 2018-12-27 ENCOUNTER — TRANSCRIPTION ENCOUNTER (OUTPATIENT)
Age: 58
End: 2018-12-27

## 2019-01-23 ENCOUNTER — NON-APPOINTMENT (OUTPATIENT)
Age: 59
End: 2019-01-23

## 2019-01-23 ENCOUNTER — APPOINTMENT (OUTPATIENT)
Dept: CARDIOLOGY | Facility: CLINIC | Age: 59
End: 2019-01-23
Payer: COMMERCIAL

## 2019-01-23 VITALS
DIASTOLIC BLOOD PRESSURE: 94 MMHG | WEIGHT: 220 LBS | OXYGEN SATURATION: 96 % | RESPIRATION RATE: 14 BRPM | HEIGHT: 70 IN | BODY MASS INDEX: 31.5 KG/M2 | SYSTOLIC BLOOD PRESSURE: 155 MMHG | HEART RATE: 65 BPM

## 2019-01-23 PROCEDURE — 93000 ELECTROCARDIOGRAM COMPLETE: CPT

## 2019-01-23 PROCEDURE — 99214 OFFICE O/P EST MOD 30 MIN: CPT

## 2019-01-30 NOTE — REVIEW OF SYSTEMS
[Shortness Of Breath] : shortness of breath [Dyspnea on exertion] : dyspnea during exertion [Chest Pain] : chest pain [see HPI] : see HPI [Negative] : Psychiatric [Lower Ext Edema] : no extremity edema [Leg Claudication] : no intermittent leg claudication [Palpitations] : no palpitations

## 2019-01-30 NOTE — DISCUSSION/SUMMARY
[Unlikely Cardiac Ischemia (Low Prob.)] : chest pain unlikely to represent cardiac ischemia (low probability) [Coronary Artery Disease] : coronary artery disease [Stable] : stable [Hypertension] : hypertension [FreeTextEntry1] : \par Currently stable from a cardiovascular standpoint. Hypertensive today. Euvolemic. Stable CAD. Atypical chest pains likely secondary to musculoskeletal etiology. Continue current medications. ECG completed today and reviewed. Will schedule an exercise ECG stress test to rule out any significant CAD/ischemia. Pending the test results, I will make further recommendations. Follow up in 6 months.

## 2019-01-30 NOTE — HISTORY OF PRESENT ILLNESS
[FreeTextEntry1] : Doing okay. Occasional shortness of breath on exertion. Occasional chest pain that is unrelated to exertion and usually relatively brief. Denies palpitations.

## 2019-02-04 ENCOUNTER — APPOINTMENT (OUTPATIENT)
Dept: GASTROENTEROLOGY | Facility: CLINIC | Age: 59
End: 2019-02-04
Payer: COMMERCIAL

## 2019-02-04 VITALS
WEIGHT: 210 LBS | BODY MASS INDEX: 30.06 KG/M2 | TEMPERATURE: 98 F | OXYGEN SATURATION: 96 % | DIASTOLIC BLOOD PRESSURE: 85 MMHG | HEIGHT: 70 IN | SYSTOLIC BLOOD PRESSURE: 140 MMHG | HEART RATE: 78 BPM

## 2019-02-04 DIAGNOSIS — Z12.11 ENCOUNTER FOR SCREENING FOR MALIGNANT NEOPLASM OF COLON: ICD-10-CM

## 2019-02-04 PROCEDURE — 99214 OFFICE O/P EST MOD 30 MIN: CPT

## 2019-02-04 NOTE — HISTORY OF PRESENT ILLNESS
[FreeTextEntry1] : Patient is a 58-year-old gentleman who is status post MI and coronary stent placement in 10/2016..  He is referred by  for a screening colonoscopy. His last colonoscopy was 12/2011.. His bowel movements are normal. He denies seeing any blood or mucus in the stool.\par Patient denies having any upper gastrointestinal complaints such as heartburn,, dysphagia, or early satiety.

## 2019-02-04 NOTE — PHYSICAL EXAM
[General Appearance - Alert] : alert [General Appearance - In No Acute Distress] : in no acute distress [Sclera] : the sclera and conjunctiva were normal [PERRL With Normal Accommodation] : pupils were equal in size, round, and reactive to light [Extraocular Movements] : extraocular movements were intact [Outer Ear] : the ears and nose were normal in appearance [Oropharynx] : the oropharynx was normal [Neck Appearance] : the appearance of the neck was normal [Neck Cervical Mass (___cm)] : no neck mass was observed [Jugular Venous Distention Increased] : there was no jugular-venous distention [Thyroid Diffuse Enlargement] : the thyroid was not enlarged [Thyroid Nodule] : there were no palpable thyroid nodules [Auscultation Breath Sounds / Voice Sounds] : lungs were clear to auscultation bilaterally [Heart Rate And Rhythm] : heart rate was normal and rhythm regular [Heart Sounds] : normal S1 and S2 [Heart Sounds Gallop] : no gallops [Murmurs] : no murmurs [Heart Sounds Pericardial Friction Rub] : no pericardial rub [Bowel Sounds] : normal bowel sounds [Abdomen Soft] : soft [Abdomen Tenderness] : non-tender [] : no hepato-splenomegaly [Abdomen Mass (___ Cm)] : no abdominal mass palpated [Occult Blood Positive] : stool was negative for occult blood [No CVA Tenderness] : no ~M costovertebral angle tenderness [No Spinal Tenderness] : no spinal tenderness [Abnormal Walk] : normal gait [Nail Clubbing] : no clubbing  or cyanosis of the fingernails [Musculoskeletal - Swelling] : no joint swelling seen [Motor Tone] : muscle strength and tone were normal [Oriented To Time, Place, And Person] : oriented to person, place, and time [Impaired Insight] : insight and judgment were intact [Affect] : the affect was normal

## 2019-02-04 NOTE — ASSESSMENT
[FreeTextEntry1] : Patient presents for a screening colonoscopy which will be scheduled. He does have a history of coronary artery disease and stent placement several years ago. He saw his cardiologist recently for atypical chest pain and is to be scheduled for a stress test which will be done prior to his colonoscopy.

## 2019-03-07 NOTE — PATIENT PROFILE ADULT. - FALLEN IN THE PAST
Spoke with patient. EUS scheduled for 3/13 at 11a. Reviewed prep instructions. Mr Garcia verbalized understanding.        Katie-Can you schedule him for an appointment?  Thanks  
no

## 2019-03-14 ENCOUNTER — APPOINTMENT (OUTPATIENT)
Dept: CV DIAGNOSTICS | Facility: HOSPITAL | Age: 59
End: 2019-03-14
Payer: COMMERCIAL

## 2019-03-14 ENCOUNTER — OUTPATIENT (OUTPATIENT)
Dept: OUTPATIENT SERVICES | Facility: HOSPITAL | Age: 59
LOS: 1 days | End: 2019-03-14

## 2019-03-14 DIAGNOSIS — I25.10 ATHEROSCLEROTIC HEART DISEASE OF NATIVE CORONARY ARTERY WITHOUT ANGINA PECTORIS: ICD-10-CM

## 2019-03-14 DIAGNOSIS — Z98.890 OTHER SPECIFIED POSTPROCEDURAL STATES: Chronic | ICD-10-CM

## 2019-03-14 PROCEDURE — 93018 CV STRESS TEST I&R ONLY: CPT | Mod: GC

## 2019-03-14 PROCEDURE — 93016 CV STRESS TEST SUPVJ ONLY: CPT | Mod: GC

## 2019-03-28 ENCOUNTER — APPOINTMENT (OUTPATIENT)
Dept: GASTROENTEROLOGY | Facility: AMBULATORY MEDICAL SERVICES | Age: 59
End: 2019-03-28
Payer: COMMERCIAL

## 2019-03-28 PROCEDURE — 45378 DIAGNOSTIC COLONOSCOPY: CPT

## 2019-04-04 ENCOUNTER — EMERGENCY (EMERGENCY)
Facility: HOSPITAL | Age: 59
LOS: 1 days | Discharge: HOME | End: 2019-04-04
Attending: EMERGENCY MEDICINE | Admitting: EMERGENCY MEDICINE
Payer: COMMERCIAL

## 2019-04-04 VITALS
DIASTOLIC BLOOD PRESSURE: 86 MMHG | SYSTOLIC BLOOD PRESSURE: 157 MMHG | OXYGEN SATURATION: 96 % | TEMPERATURE: 99 F | HEIGHT: 68 IN | HEART RATE: 85 BPM | WEIGHT: 199.96 LBS | RESPIRATION RATE: 16 BRPM

## 2019-04-04 VITALS
SYSTOLIC BLOOD PRESSURE: 147 MMHG | HEART RATE: 97 BPM | RESPIRATION RATE: 16 BRPM | OXYGEN SATURATION: 100 % | DIASTOLIC BLOOD PRESSURE: 91 MMHG | TEMPERATURE: 98 F

## 2019-04-04 DIAGNOSIS — Z98.890 OTHER SPECIFIED POSTPROCEDURAL STATES: Chronic | ICD-10-CM

## 2019-04-04 LAB
ALBUMIN SERPL ELPH-MCNC: 4.6 G/DL — SIGNIFICANT CHANGE UP (ref 3.3–5)
ALP SERPL-CCNC: 70 U/L — SIGNIFICANT CHANGE UP (ref 40–120)
ALT FLD-CCNC: 14 U/L — SIGNIFICANT CHANGE UP (ref 4–41)
ANION GAP SERPL CALC-SCNC: 12 MMO/L — SIGNIFICANT CHANGE UP (ref 7–14)
APTT BLD: 31.7 SEC — SIGNIFICANT CHANGE UP (ref 27.5–36.3)
AST SERPL-CCNC: 10 U/L — SIGNIFICANT CHANGE UP (ref 4–40)
BASOPHILS # BLD AUTO: 0.06 K/UL — SIGNIFICANT CHANGE UP (ref 0–0.2)
BASOPHILS NFR BLD AUTO: 0.6 % — SIGNIFICANT CHANGE UP (ref 0–2)
BILIRUB SERPL-MCNC: 0.3 MG/DL — SIGNIFICANT CHANGE UP (ref 0.2–1.2)
BUN SERPL-MCNC: 13 MG/DL — SIGNIFICANT CHANGE UP (ref 7–23)
CALCIUM SERPL-MCNC: 9.6 MG/DL — SIGNIFICANT CHANGE UP (ref 8.4–10.5)
CHLORIDE SERPL-SCNC: 100 MMOL/L — SIGNIFICANT CHANGE UP (ref 98–107)
CO2 SERPL-SCNC: 23 MMOL/L — SIGNIFICANT CHANGE UP (ref 22–31)
CREAT SERPL-MCNC: 0.85 MG/DL — SIGNIFICANT CHANGE UP (ref 0.5–1.3)
CRP SERPL-MCNC: 70.1 MG/L — HIGH
EOSINOPHIL # BLD AUTO: 0.36 K/UL — SIGNIFICANT CHANGE UP (ref 0–0.5)
EOSINOPHIL NFR BLD AUTO: 3.9 % — SIGNIFICANT CHANGE UP (ref 0–6)
ERYTHROCYTE [SEDIMENTATION RATE] IN BLOOD: 21 MM/HR — HIGH (ref 1–15)
GLUCOSE SERPL-MCNC: 150 MG/DL — HIGH (ref 70–99)
HCT VFR BLD CALC: 44.2 % — SIGNIFICANT CHANGE UP (ref 39–50)
HGB BLD-MCNC: 14.2 G/DL — SIGNIFICANT CHANGE UP (ref 13–17)
IMM GRANULOCYTES NFR BLD AUTO: 0.6 % — SIGNIFICANT CHANGE UP (ref 0–1.5)
INR BLD: 0.91 — SIGNIFICANT CHANGE UP (ref 0.88–1.17)
LYMPHOCYTES # BLD AUTO: 1.49 K/UL — SIGNIFICANT CHANGE UP (ref 1–3.3)
LYMPHOCYTES # BLD AUTO: 16 % — SIGNIFICANT CHANGE UP (ref 13–44)
MCHC RBC-ENTMCNC: 30.7 PG — SIGNIFICANT CHANGE UP (ref 27–34)
MCHC RBC-ENTMCNC: 32.1 % — SIGNIFICANT CHANGE UP (ref 32–36)
MCV RBC AUTO: 95.7 FL — SIGNIFICANT CHANGE UP (ref 80–100)
MONOCYTES # BLD AUTO: 0.79 K/UL — SIGNIFICANT CHANGE UP (ref 0–0.9)
MONOCYTES NFR BLD AUTO: 8.5 % — SIGNIFICANT CHANGE UP (ref 2–14)
NEUTROPHILS # BLD AUTO: 6.56 K/UL — SIGNIFICANT CHANGE UP (ref 1.8–7.4)
NEUTROPHILS NFR BLD AUTO: 70.4 % — SIGNIFICANT CHANGE UP (ref 43–77)
NRBC # FLD: 0 K/UL — SIGNIFICANT CHANGE UP (ref 0–0)
PLATELET # BLD AUTO: 197 K/UL — SIGNIFICANT CHANGE UP (ref 150–400)
PMV BLD: 10.4 FL — SIGNIFICANT CHANGE UP (ref 7–13)
POTASSIUM SERPL-MCNC: 4.3 MMOL/L — SIGNIFICANT CHANGE UP (ref 3.5–5.3)
POTASSIUM SERPL-SCNC: 4.3 MMOL/L — SIGNIFICANT CHANGE UP (ref 3.5–5.3)
PROT SERPL-MCNC: 6.9 G/DL — SIGNIFICANT CHANGE UP (ref 6–8.3)
PROTHROM AB SERPL-ACNC: 10.4 SEC — SIGNIFICANT CHANGE UP (ref 9.8–13.1)
RBC # BLD: 4.62 M/UL — SIGNIFICANT CHANGE UP (ref 4.2–5.8)
RBC # FLD: 12.6 % — SIGNIFICANT CHANGE UP (ref 10.3–14.5)
SODIUM SERPL-SCNC: 135 MMOL/L — SIGNIFICANT CHANGE UP (ref 135–145)
WBC # BLD: 9.32 K/UL — SIGNIFICANT CHANGE UP (ref 3.8–10.5)
WBC # FLD AUTO: 9.32 K/UL — SIGNIFICANT CHANGE UP (ref 3.8–10.5)

## 2019-04-04 PROCEDURE — 73562 X-RAY EXAM OF KNEE 3: CPT | Mod: 26,RT

## 2019-04-04 PROCEDURE — 93970 EXTREMITY STUDY: CPT | Mod: 26

## 2019-04-04 PROCEDURE — 99283 EMERGENCY DEPT VISIT LOW MDM: CPT

## 2019-04-04 NOTE — ED PROVIDER NOTE - PROGRESS NOTE DETAILS
Jaz Padgett MD PGY-1 septic joint unlikely based one exam, no erythema, no pain with ROM, no fevers. no DVT. No acute fractures, just chondrocalcinosis on xray of knee. D/c'd home with rest, ice, compression (placed in ACE wrap), elevation, work note x 5 days. PMD f/u and strict return precuautions

## 2019-04-04 NOTE — ED PROVIDER NOTE - CARE PLAN
Principal Discharge DX:	Knee pain, right  Assessment and plan of treatment:	The patient was discharged from the ED in stable condition. All results of today's workup were discussed with the patient and all questions/concerns were addressed. All discharge instructions were thoroughly verbally discussed with the patient, as well as important warning signs and new/ worsening symptoms which should necessitate patient's immediate return to the ED. The patient is agreeable with discharge and expresses full understanding of all instructions given.

## 2019-04-04 NOTE — ED PROVIDER NOTE - PHYSICAL EXAMINATION
Attending/Pamella: Well-appearing, NAD; PERRL/EOMI, non-icterus, supple, no LYNNETTE, no JVD, RRR, CTAB; Abd-soft, NT/ND, no HSM; no RLE swelling, no joint eff, FROM, +calf PT, A&Ox3, nonfocal; Skin-warm/dry

## 2019-04-04 NOTE — ED ADULT NURSE NOTE - NSIMPLEMENTINTERV_GEN_ALL_ED
Implemented All Fall with Harm Risk Interventions:  [place] to call system. Call bell, personal items and telephone within reach. Instruct patient to call for assistance. Room bathroom lighting operational. Non-slip footwear when patient is off stretcher. Physically safe environment: no spills, clutter or unnecessary equipment. Stretcher in lowest position, wheels locked, appropriate side rails in place. Provide visual cue, wrist band, yellow gown, etc. Monitor gait and stability. Monitor for mental status changes and reorient to person, place, and time. Review medications for side effects contributing to fall risk. Reinforce activity limits and safety measures with patient and family. Provide visual clues: red socks.

## 2019-04-04 NOTE — ED PROCEDURE NOTE - PROCEDURE SUPERVISED BY
GROUP THERAPY PROGRESS NOTE Jasmyn Forman is participating in [place]. Group time: 30 minutes Goal orientation: community Group therapy participation: active Therapeutic interventions reviewed and discussed:   Unit guidelines and daily routine were reviewed. Patients set a goal for the day. Each patient was asked to say something positive about one of their peers. Impression of participation:   Blanca Castelan goal for the day was to follow directions the first time. Dr. Can

## 2019-04-04 NOTE — ED PROCEDURE NOTE - CPROC ED POST PROC CARE GUIDE1
Instructed patient/caregiver regarding signs and symptoms of infection./Keep the cast/splint/dressing clean and dry./Verbal/written post procedure instructions were given to patient/caregiver./Elevate the injured extremity as instructed./Instructed patient/caregiver to follow-up with primary care physician.

## 2019-04-04 NOTE — ED PROVIDER NOTE - OBJECTIVE STATEMENT
Attending/Pamella: 58 M w/ h/o DM, CAD s/p cath/stent p/w atraumatic RLE swelling. Pt reports after getting out of bed yesterday and ambulating, noted RLE pain an swelling. Denies fever/chills, weakness or numbness. Further denies CP, SOB, palp.

## 2019-04-04 NOTE — ED PROVIDER NOTE - NSFOLLOWUPINSTRUCTIONS_ED_ALL_ED_FT
1. You were seen in the Emergency Room for knee pain. This is unlikely a result of infection or blood clot based on the results of the labs and imaging  2. You may take Ibuprofen 400 mg every 4 hours as needed (no more than 6 times a day). You may alternate this with Tylenol 650 mg every 6 hours as needed (no more than 4 times a day). Stop taking these medications and speak to your doctor if you notice stomach pain or bleeding.   3. Please follow up with your doctor in 24-48 hours.  4. Return to the emergency room or seek immediate assistance for any new or concerning symptoms (such as fevers, chills, worsening knee pain, redness, swelling or decreased ability to move your leg), or if you get worse.   5. Copies of your tests were provided to you for follow-up.  You must address all your findings with your doctor.

## 2019-05-03 ENCOUNTER — RX RENEWAL (OUTPATIENT)
Age: 59
End: 2019-05-03

## 2019-05-29 ENCOUNTER — APPOINTMENT (OUTPATIENT)
Dept: CARDIOLOGY | Facility: CLINIC | Age: 59
End: 2019-05-29

## 2019-06-03 ENCOUNTER — INPATIENT (INPATIENT)
Facility: HOSPITAL | Age: 59
LOS: 0 days | Discharge: HOME | DRG: 641 | End: 2019-06-04
Attending: INTERNAL MEDICINE | Admitting: INTERNAL MEDICINE
Payer: COMMERCIAL

## 2019-06-03 VITALS
HEIGHT: 69 IN | HEART RATE: 76 BPM | DIASTOLIC BLOOD PRESSURE: 99 MMHG | TEMPERATURE: 98 F | OXYGEN SATURATION: 97 % | WEIGHT: 199.96 LBS | SYSTOLIC BLOOD PRESSURE: 186 MMHG

## 2019-06-03 DIAGNOSIS — Z98.890 OTHER SPECIFIED POSTPROCEDURAL STATES: Chronic | ICD-10-CM

## 2019-06-03 DIAGNOSIS — R26.9 UNSPECIFIED ABNORMALITIES OF GAIT AND MOBILITY: ICD-10-CM

## 2019-06-03 LAB
ALBUMIN SERPL ELPH-MCNC: 4.6 G/DL — SIGNIFICANT CHANGE UP (ref 3.3–5)
ALP SERPL-CCNC: 79 U/L — SIGNIFICANT CHANGE UP (ref 40–120)
ALT FLD-CCNC: 23 U/L — SIGNIFICANT CHANGE UP (ref 10–45)
ANION GAP SERPL CALC-SCNC: 13 MMOL/L — SIGNIFICANT CHANGE UP (ref 5–17)
APTT BLD: 30.5 SEC — SIGNIFICANT CHANGE UP (ref 27.5–36.3)
AST SERPL-CCNC: 15 U/L — SIGNIFICANT CHANGE UP (ref 10–40)
BASOPHILS # BLD AUTO: 0.1 K/UL — SIGNIFICANT CHANGE UP (ref 0–0.2)
BASOPHILS NFR BLD AUTO: 0.5 % — SIGNIFICANT CHANGE UP (ref 0–2)
BILIRUB SERPL-MCNC: 0.6 MG/DL — SIGNIFICANT CHANGE UP (ref 0.2–1.2)
BUN SERPL-MCNC: 12 MG/DL — SIGNIFICANT CHANGE UP (ref 7–23)
CALCIUM SERPL-MCNC: 9.4 MG/DL — SIGNIFICANT CHANGE UP (ref 8.4–10.5)
CHLORIDE SERPL-SCNC: 104 MMOL/L — SIGNIFICANT CHANGE UP (ref 96–108)
CO2 SERPL-SCNC: 21 MMOL/L — LOW (ref 22–31)
CREAT SERPL-MCNC: 0.7 MG/DL — SIGNIFICANT CHANGE UP (ref 0.5–1.3)
EOSINOPHIL # BLD AUTO: 0.3 K/UL — SIGNIFICANT CHANGE UP (ref 0–0.5)
EOSINOPHIL NFR BLD AUTO: 2.7 % — SIGNIFICANT CHANGE UP (ref 0–6)
GLUCOSE BLDC GLUCOMTR-MCNC: 243 MG/DL — HIGH (ref 70–99)
GLUCOSE SERPL-MCNC: 185 MG/DL — HIGH (ref 70–99)
HCT VFR BLD CALC: 44.9 % — SIGNIFICANT CHANGE UP (ref 39–50)
HGB BLD-MCNC: 15.4 G/DL — SIGNIFICANT CHANGE UP (ref 13–17)
INR BLD: 0.92 RATIO — SIGNIFICANT CHANGE UP (ref 0.88–1.16)
LYMPHOCYTES # BLD AUTO: 1.3 K/UL — SIGNIFICANT CHANGE UP (ref 1–3.3)
LYMPHOCYTES # BLD AUTO: 10.6 % — LOW (ref 13–44)
MCHC RBC-ENTMCNC: 33 PG — SIGNIFICANT CHANGE UP (ref 27–34)
MCHC RBC-ENTMCNC: 34.3 GM/DL — SIGNIFICANT CHANGE UP (ref 32–36)
MCV RBC AUTO: 96.1 FL — SIGNIFICANT CHANGE UP (ref 80–100)
MONOCYTES # BLD AUTO: 0.6 K/UL — SIGNIFICANT CHANGE UP (ref 0–0.9)
MONOCYTES NFR BLD AUTO: 5.2 % — SIGNIFICANT CHANGE UP (ref 2–14)
NEUTROPHILS # BLD AUTO: 10 K/UL — HIGH (ref 1.8–7.4)
NEUTROPHILS NFR BLD AUTO: 81 % — HIGH (ref 43–77)
PLATELET # BLD AUTO: 212 K/UL — SIGNIFICANT CHANGE UP (ref 150–400)
POTASSIUM SERPL-MCNC: 4.1 MMOL/L — SIGNIFICANT CHANGE UP (ref 3.5–5.3)
POTASSIUM SERPL-SCNC: 4.1 MMOL/L — SIGNIFICANT CHANGE UP (ref 3.5–5.3)
PROT SERPL-MCNC: 6.8 G/DL — SIGNIFICANT CHANGE UP (ref 6–8.3)
PROTHROM AB SERPL-ACNC: 10.5 SEC — SIGNIFICANT CHANGE UP (ref 10–12.9)
RBC # BLD: 4.67 M/UL — SIGNIFICANT CHANGE UP (ref 4.2–5.8)
RBC # FLD: 11.8 % — SIGNIFICANT CHANGE UP (ref 10.3–14.5)
SODIUM SERPL-SCNC: 138 MMOL/L — SIGNIFICANT CHANGE UP (ref 135–145)
TROPONIN T, HIGH SENSITIVITY RESULT: 10 NG/L — SIGNIFICANT CHANGE UP (ref 0–51)
TROPONIN T, HIGH SENSITIVITY RESULT: 10 NG/L — SIGNIFICANT CHANGE UP (ref 0–51)
TROPONIN T, HIGH SENSITIVITY RESULT: 11 NG/L — SIGNIFICANT CHANGE UP (ref 0–51)
WBC # BLD: 12.3 K/UL — HIGH (ref 3.8–10.5)
WBC # FLD AUTO: 12.3 K/UL — HIGH (ref 3.8–10.5)

## 2019-06-03 PROCEDURE — 70498 CT ANGIOGRAPHY NECK: CPT | Mod: 26

## 2019-06-03 PROCEDURE — 70496 CT ANGIOGRAPHY HEAD: CPT | Mod: 26

## 2019-06-03 PROCEDURE — 99223 1ST HOSP IP/OBS HIGH 75: CPT | Mod: GC

## 2019-06-03 PROCEDURE — 99285 EMERGENCY DEPT VISIT HI MDM: CPT

## 2019-06-03 PROCEDURE — 71046 X-RAY EXAM CHEST 2 VIEWS: CPT | Mod: 26

## 2019-06-03 RX ORDER — DEXTROSE 50 % IN WATER 50 %
25 SYRINGE (ML) INTRAVENOUS ONCE
Refills: 0 | Status: DISCONTINUED | OUTPATIENT
Start: 2019-06-03 | End: 2019-06-04

## 2019-06-03 RX ORDER — ISOSORBIDE MONONITRATE 60 MG/1
30 TABLET, EXTENDED RELEASE ORAL DAILY
Refills: 0 | Status: DISCONTINUED | OUTPATIENT
Start: 2019-06-03 | End: 2019-06-04

## 2019-06-03 RX ORDER — DEXTROSE 50 % IN WATER 50 %
12.5 SYRINGE (ML) INTRAVENOUS ONCE
Refills: 0 | Status: DISCONTINUED | OUTPATIENT
Start: 2019-06-03 | End: 2019-06-04

## 2019-06-03 RX ORDER — GLUCAGON INJECTION, SOLUTION 0.5 MG/.1ML
1 INJECTION, SOLUTION SUBCUTANEOUS ONCE
Refills: 0 | Status: DISCONTINUED | OUTPATIENT
Start: 2019-06-03 | End: 2019-06-04

## 2019-06-03 RX ORDER — SODIUM CHLORIDE 9 MG/ML
1000 INJECTION, SOLUTION INTRAVENOUS
Refills: 0 | Status: DISCONTINUED | OUTPATIENT
Start: 2019-06-03 | End: 2019-06-04

## 2019-06-03 RX ORDER — DEXTROSE 50 % IN WATER 50 %
15 SYRINGE (ML) INTRAVENOUS ONCE
Refills: 0 | Status: DISCONTINUED | OUTPATIENT
Start: 2019-06-03 | End: 2019-06-04

## 2019-06-03 RX ORDER — METFORMIN HYDROCHLORIDE 850 MG/1
1 TABLET ORAL
Qty: 0 | Refills: 0 | DISCHARGE

## 2019-06-03 RX ORDER — ATORVASTATIN CALCIUM 80 MG/1
80 TABLET, FILM COATED ORAL AT BEDTIME
Refills: 0 | Status: DISCONTINUED | OUTPATIENT
Start: 2019-06-03 | End: 2019-06-04

## 2019-06-03 RX ORDER — HEPARIN SODIUM 5000 [USP'U]/ML
5000 INJECTION INTRAVENOUS; SUBCUTANEOUS EVERY 12 HOURS
Refills: 0 | Status: DISCONTINUED | OUTPATIENT
Start: 2019-06-03 | End: 2019-06-04

## 2019-06-03 RX ORDER — ASPIRIN/CALCIUM CARB/MAGNESIUM 324 MG
81 TABLET ORAL DAILY
Refills: 0 | Status: DISCONTINUED | OUTPATIENT
Start: 2019-06-03 | End: 2019-06-04

## 2019-06-03 RX ORDER — TICAGRELOR 90 MG/1
90 TABLET ORAL
Refills: 0 | Status: DISCONTINUED | OUTPATIENT
Start: 2019-06-03 | End: 2019-06-04

## 2019-06-03 RX ORDER — LISINOPRIL 2.5 MG/1
5 TABLET ORAL DAILY
Refills: 0 | Status: DISCONTINUED | OUTPATIENT
Start: 2019-06-03 | End: 2019-06-04

## 2019-06-03 RX ORDER — INSULIN LISPRO 100/ML
VIAL (ML) SUBCUTANEOUS
Refills: 0 | Status: DISCONTINUED | OUTPATIENT
Start: 2019-06-03 | End: 2019-06-04

## 2019-06-03 RX ORDER — CARVEDILOL PHOSPHATE 80 MG/1
3.12 CAPSULE, EXTENDED RELEASE ORAL EVERY 12 HOURS
Refills: 0 | Status: DISCONTINUED | OUTPATIENT
Start: 2019-06-03 | End: 2019-06-04

## 2019-06-03 RX ADMIN — ISOSORBIDE MONONITRATE 30 MILLIGRAM(S): 60 TABLET, EXTENDED RELEASE ORAL at 14:54

## 2019-06-03 RX ADMIN — Medication 4: at 18:20

## 2019-06-03 RX ADMIN — ATORVASTATIN CALCIUM 80 MILLIGRAM(S): 80 TABLET, FILM COATED ORAL at 22:17

## 2019-06-03 RX ADMIN — HEPARIN SODIUM 5000 UNIT(S): 5000 INJECTION INTRAVENOUS; SUBCUTANEOUS at 14:55

## 2019-06-03 RX ADMIN — HEPARIN SODIUM 5000 UNIT(S): 5000 INJECTION INTRAVENOUS; SUBCUTANEOUS at 22:17

## 2019-06-03 RX ADMIN — TICAGRELOR 90 MILLIGRAM(S): 90 TABLET ORAL at 22:17

## 2019-06-03 RX ADMIN — CARVEDILOL PHOSPHATE 3.12 MILLIGRAM(S): 80 CAPSULE, EXTENDED RELEASE ORAL at 18:00

## 2019-06-03 NOTE — ED ADULT NURSE NOTE - OBJECTIVE STATEMENT
59 y/o male PMH 57 y/o male PMH MI, stent and diabetes presents to ED reporting at 07:30AM palpitations, sweating. Pt also reports blurred vision and feeling like he "blacked out" for a few seconds. Code stroke called at 09:12, MD and RN at bedside. Pt taken directly to CT scan for imaging, neurology met pt at CT scan. On exam, AOx3, speaking in complete sentences. Lung sounds CTA, NAD, O2 sat 98% RA. Abdomen soft, non-tender, non-distended, normoactive bowel sounds in all 4 quadrants. PERRL 3mm, equal strength and sensation in all 4 extremities. Heplock placed, labs sent.

## 2019-06-03 NOTE — CONSULT NOTE ADULT - ATTENDING COMMENTS
Patient seen and examined. Agree with assessment and plan as outlined above. Above assessment and plan authored by myself. Discussed with Dr. Ellison patient's outpatient cardiologist.

## 2019-06-03 NOTE — H&P ADULT - ASSESSMENT
pt w/ significant cad / sscp/ dizziness/  r/o acs  cards eval  trops  ischemic w/u as per cards  dvt proph  c/w outpt meds  neuro eval noted   \

## 2019-06-03 NOTE — H&P ADULT - NSICDXPASTMEDICALHX_GEN_ALL_CORE_FT
PAST MEDICAL HISTORY:  HTN (hypertension)     Hyperlipidemia     Ischemic cardiomyopathy     MI, old     Stented coronary artery

## 2019-06-03 NOTE — H&P ADULT - HISTORY OF PRESENT ILLNESS
Patient is a 3 y/o male  with a history of HTN, HLD, CAD s/p multiple stents, tobacco use who presents to the ED due to lightheadedness.   Patient states that he initially felt fine this morning when he woke up this am  and was making  breakfast when he started to have  palpitations and had some  chest pain. He started to feel lightheaded, diaphoretic, and started to feel like he was blacking out  He remained standing during this time and then went to sit down in a chair and rest of a bit and started to feel better   He denies diplopia, paresthesias, focal weakness, changes in speech.

## 2019-06-03 NOTE — ED PROVIDER NOTE - NS ED ROS FT
CONSTITUTIONAL: No fevers, no chills  Eyes: no visual changes  Mouth/Throat: no sore throat  Cardiovascular: +CP  Gastrointestinal: No n/v/d, no abd pain  Genitourinary: no dysuria, no hematuria  SKIN: no rashes.  NEURO: no headache  PSYCHIATRIC: no known mental health issues.

## 2019-06-03 NOTE — CONSULT NOTE ADULT - ASSESSMENT
Patient is a 58M with a history of HTN, HLD, CAD s/p multiple stents, tobacco use who presents to the ED due to lightheadedness. Patient states that he initially felt fine this morning when he woke up and then around 7:30 am he was cooking breakfast when he started to notice palpitations and had some vague chest pain. He started to feel very lightheaded, diaphoretic, and started to feel like he was blacking out for a couple minutes. He remained standing during this time and then went to sit down in a chair and rest of a bit and started to feel better. He currently is complaining of some neck pain posteriorly and feels generally weak. He denies diplopia, paresthesias, focal weakness, changes in speech. He takes aspirin, Brilinta, and atorvastatin due to his cardiac history.     NIHSS 0, MRS 0. CTH, CTA and CTV were within normal limits. Patient was not a tpa or endovascular candidate due to NIHSS of 0.     Impression: episode more consistent with syncope, likely cardiac in etiology especially given patient's history     Recommendations:  No further inpatient neurological work-up at this time  Cardiac/syncope work-up per ED/primary team

## 2019-06-03 NOTE — ED ADULT NURSE REASSESSMENT NOTE - NS ED NURSE REASSESS COMMENT FT1
Pt admitted to telemetry, diagnosis of gait abnormality and chest pain. Awaiting bed placement at this time.

## 2019-06-03 NOTE — CONSULT NOTE ADULT - SUBJECTIVE AND OBJECTIVE BOX
Patient seen and evaluated at bedside    Chief Complaint:    HPI:  Patient is a 5 y/o male  with a history of HTN, HLD, CAD s/p multiple stents, tobacco use who presents to the ED due to lightheadedness.   Patient states that he initially felt fine this morning when he woke up this am  and was making  breakfast when he started to have  palpitations and had some  chest pain. He started to feel lightheaded, diaphoretic, and started to feel like he was blacking out  He remained standing during this time and then went to sit down in a chair and rest of a bit and started to feel better   He denies diplopia, paresthesias, focal weakness, changes in speech. (03 Jun 2019 10:58)      PMHx:   Ischemic cardiomyopathy  Stented coronary artery  Hyperlipidemia  MI, old  HTN (hypertension)      PSHx:   History of cardiac cath  No significant past surgical history      Allergies:  No Known Allergies      Home Meds:    Current Medications:   aspirin enteric coated 81 milliGRAM(s) Oral daily  atorvastatin 80 milliGRAM(s) Oral at bedtime  carvedilol 3.125 milliGRAM(s) Oral every 12 hours  heparin  Injectable 5000 Unit(s) SubCutaneous every 12 hours  isosorbide   mononitrate ER Tablet (IMDUR) 30 milliGRAM(s) Oral daily  lisinopril 5 milliGRAM(s) Oral daily  ticagrelor 90 milliGRAM(s) Oral two times a day      FAMILY HISTORY:  Family history of hypertension (Sibling)      Social History:  Smoking History:  Alcohol Use:  Drug Use:    REVIEW OF SYSTEMS:  CONSTITUTIONAL: No weakness, fevers or chills  EYES/ENT: No visual changes;  No dysphagia  NECK: No pain or stiffness  RESPIRATORY: No cough, wheezing, hemoptysis; No shortness of breath  CARDIOVASCULAR: No chest pain or palpitations; No lower extremity edema  GASTROINTESTINAL: No abdominal or epigastric pain. No nausea, vomiting, or hematemesis; No diarrhea or constipation. No melena or hematochezia.  BACK: No back pain  GENITOURINARY: No dysuria, frequency or hematuria  NEUROLOGICAL: No numbness or weakness  SKIN: No itching, burning, rashes, or lesions   All other review of systems is negative unless indicated above.    Physical Exam:  T(F): 97.9 (06-03), Max: 97.9 (06-03)  HR: 72 (06-03) (72 - 76)  BP: 149/88 (06-03) (149/88 - 186/99)  RR: 19 (06-03)  SpO2: 98% (06-03)  GENERAL: No acute distress, well-developed  HEAD:  Atraumatic, Normocephalic  ENT: EOMI, PERRLA, conjunctiva and sclera clear, Neck supple, No JVD, moist mucosa  CHEST/LUNG: Clear to auscultation bilaterally; No wheeze, equal breath sounds bilaterally   BACK: No spinal tenderness  HEART: Regular rate and rhythm; No murmurs, rubs, or gallops  ABDOMEN: Soft, Nontender, Nondistended; Bowel sounds present  EXTREMITIES:  No clubbing, cyanosis, or edema  PSYCH: Nl behavior, nl affect  NEUROLOGY: AAOx3, non-focal, cranial nerves intact  SKIN: Normal color, No rashes or lesions  LINES:    Cardiovascular Diagnostic Testing:    ECG: Personally reviewed:    Echo: Personally reviewed:    Stress Testing:    Cath:    Imaging:    CXR: Personally reviewed    Labs: Personally reviewed                        15.4   12.3  )-----------( 212      ( 03 Jun 2019 09:16 )             44.9     06-03    138  |  104  |  12  ----------------------------<  185<H>  4.1   |  21<L>  |  0.70    Ca    9.4      03 Jun 2019 09:16    TPro  6.8  /  Alb  4.6  /  TBili  0.6  /  DBili  x   /  AST  15  /  ALT  23  /  AlkPhos  79  06-03    PT/INR - ( 03 Jun 2019 09:16 )   PT: 10.5 sec;   INR: 0.92 ratio         PTT - ( 03 Jun 2019 09:16 )  PTT:30.5 sec    CARDIAC MARKERS ( 03 Jun 2019 10:32 )  10 ng/L / x     / x     / x     / x     / x      CARDIAC MARKERS ( 03 Jun 2019 09:16 )  11 ng/L / x     / x     / x     / x     / x Patient seen and evaluated at bedside    Chief Complaint:    HPI:  Patient is a 5 y/o male  with a history of HTN, HLD, CAD s/p multiple stents, tobacco use who presents to the ED due to lightheadedness.   Patient states that he initially felt fine this morning when he woke up this am  and was making  breakfast when he started to have  palpitations and had some  chest pain. He started to feel lightheaded, diaphoretic, and started to feel like he was blacking out  He remained standing during this time and then went to sit down in a chair and rest of a bit and started to feel better   He denies diplopia, paresthesias, focal weakness, changes in speech. (2019 10:58)    BEDSIDE: At bedside denies cp, SOB, orthopnea, palpitations, abdominal pain, fevers, chills, nausea, vomiting.      PMHx:   Ischemic cardiomyopathy  Stented coronary artery  Hyperlipidemia  MI, old  HTN (hypertension)      PSHx:   History of cardiac cath  No significant past surgical history      Allergies:  No Known Allergies      Home Meds:    Current Medications:   aspirin enteric coated 81 milliGRAM(s) Oral daily  atorvastatin 80 milliGRAM(s) Oral at bedtime  carvedilol 3.125 milliGRAM(s) Oral every 12 hours  heparin  Injectable 5000 Unit(s) SubCutaneous every 12 hours  isosorbide   mononitrate ER Tablet (IMDUR) 30 milliGRAM(s) Oral daily  lisinopril 5 milliGRAM(s) Oral daily  ticagrelor 90 milliGRAM(s) Oral two times a day      FAMILY HISTORY:  Family history of hypertension (Sibling)      Social History:  Smoking History:  Alcohol Use:  Drug Use:    REVIEW OF SYSTEMS:  CONSTITUTIONAL: No weakness, fevers or chills  EYES/ENT: No visual changes;  No dysphagia  NECK: No pain or stiffness  RESPIRATORY: No cough, wheezing, hemoptysis; No shortness of breath  CARDIOVASCULAR: No chest pain or palpitations; No lower extremity edema  GASTROINTESTINAL: No abdominal or epigastric pain. No nausea, vomiting, or hematemesis; No diarrhea or constipation. No melena or hematochezia.  BACK: No back pain  GENITOURINARY: No dysuria, frequency or hematuria  NEUROLOGICAL: No numbness or weakness  SKIN: No itching, burning, rashes, or lesions   All other review of systems is negative unless indicated above.    Physical Exam:  T(F): 97.9 (-03), Max: 97.9 ()  HR: 72 () (72 - 76)  BP: 149/88 (-) (149/88 - 186/99)  RR: 19 (-)  SpO2: 98% ()  GENERAL: No acute distress, well-developed  HEAD:  Atraumatic, Normocephalic  ENT: EOMI, PERRLA, conjunctiva and sclera clear, Neck supple, No JVD, moist mucosa  CHEST/LUNG: Clear to auscultation bilaterally; No wheeze, equal breath sounds bilaterally   BACK: No spinal tenderness  HEART: Regular rate and rhythm; No murmurs, rubs, or gallops  ABDOMEN: Soft, Nontender, Nondistended; Bowel sounds present  EXTREMITIES:  No clubbing, cyanosis, or edema  PSYCH: Nl behavior, nl affect  NEUROLOGY: AAOx3, non-focal, cranial nerves intact  SKIN: Normal color, No rashes or lesions  LINES:    Cardiovascular Diagnostic Testing:    ECG: Personally reviewed:    Echo: Personally reviewed:    Stress Testing:    Cath:    Imaging:    CXR: Personally reviewed    Labs: Personally reviewed                        15.4   12.3  )-----------( 212      ( 2019 09:16 )             44.9     -    138  |  104  |  12  ----------------------------<  185<H>  4.1   |  21<L>  |  0.70    Ca    9.4      2019 09:16    TPro  6.8  /  Alb  4.6  /  TBili  0.6  /  DBili  x   /  AST  15  /  ALT  23  /  AlkPhos  79  06-03    PT/INR - ( 2019 09:16 )   PT: 10.5 sec;   INR: 0.92 ratio         PTT - ( 2019 09:16 )  PTT:30.5 sec    CARDIAC MARKERS ( 2019 10:32 )  10 ng/L / x     / x     / x     / x     / x      CARDIAC MARKERS ( 2019 09:16 )  11 ng/L / x     / x     / x     / x     / x        < from: Transthoracic Echocardiogram (17 @ 09:38) >  Ejection Fraction (Teicholtz): 67 %  ------------------------------------------------------------------------  OBSERVATIONS:  Mitral Valve: Normal mitral valve. Mild mitral  regurgitation.  Aortic Root: Normal aortic root.  Aortic Valve: Normal trileaflet aortic valve.  Left Atrium: Normal left atrium.  Left Ventricle: Mild segmental left ventricular systolic  dysfunction the distal anteroseptum appears hypokinetic.  Overall EF appears preserved. Normal left ventricular  internal dimensions and wall thicknesses. Mild diastolic  dysfunction (Stage I).  Right Heart: Normal right atrium. Normal right ventricular  size and function. Normal tricuspid valve. Normal pulmonic  valve.  Pericardium/PleuraNormal pericardium with no pericardial  effusion.  ------------------------------------------------------------------------  CONCLUSIONS:  1. Normal left ventricular internal dimensions and wall  thicknesses.  2. Mild segmental left ventricular systolic dysfunction the  distal anteroseptum appears hypokinetic. Overall EF appears  preserved.  3. Mild diastolic dysfunction (Stage I).  4. Normal right ventricular size and function.  *** Compared with echocardiogram of 10/11/2016, the LV  function appears improved.    < end of copied text >        < from: Cardiac Cath Lab - Adult (12.22.17 @ 11:15) >  VENTRICLES: Global left ventricular function was borderline normal. EF  estimated was 50 %.  CORONARY VESSELS: The coronary circulation is right dominant.  LM:   --  LM: Normal.  LAD:   --  Proximal LAD: There was a discrete 20 % stenosis at the site of  a prior stent.  --  Mid LAD: There was a discrete 20 % stenosis at the site of a prior  stent, in themiddle third of the vessel segment. In a second lesion,  there was a discrete 60 % stenosis in the distal third of the vessel  segment. There was VASILIY grade 3 flow through the vessel (brisk flow).  CX:   --  Mid circumflex: The vessel was very small sized. There was a  tubular 40 % stenosis.  --  OM1: Angiography showed minor luminal irregularities with no flow  limiting lesions. There was a tubular 30 % stenosis in the proximal third  of the vessel segment.  RCA:   --  RCA: Angiography showed minor luminal irregularities with no  flow limiting lesions.  --  Mid RCA: There was a discrete 20 % stenosis.  --  Distal RCA: There was a discrete 20 % stenosis.  COMPLICATIONS: There were no complications.  SUMMARY:  1ST LESION INTERVENTIONS: A successful balloon angioplasty with stent was  performed on the 60 % lesion in the mid LAD. Following intervention there  was a 1 % residual stenosis.  DIAGNOSTIC RECOMMENDATIONS: PCI of mid LAD lesion for treatment of unstable  angina.  INTERVENTIONAL RECOMMENDATIONS: Continue aspirin, 81 mg, PO, daily.  Continue ticagrelor 90 mg twice daily. Patient management should include  aggressive medical therapy.  Prepared and signed by  Sidney Ellison M.D.  Signed 2017 20:35:36  HEMODYNAMIC TABLES  Pressures:  Baseline  Pressures:  - HR: 70  Pressures:  - Rhythm:  Pressures:  -- Aortic Pressure (S/D/M): 128/70/91  Pressures:  -- Left Ventricle (s/edp): 124/15/--  Pressures:  Post Angio  Pressures:  - HR: 66  Pressures:  - Rhythm:  Pressures:  -- Aortic Pressure (S/D/M): 123/69/91  Pressures:  -- Left Ventricle (s/edp): 115/19/--  Outputs:  Baseline  Outputs:  -- CALCULATIONS[age]  Outputs:  -- CALCULATIONS: Body Surface Area: 2.05  Outputs:  -- CALCULATIONS: Height in cm: 174.00  Outputs:  -- CALCULATIONS: Sex: Male  Outputs:  -- CALCULATIONS: Weight in k.70  Outputs:  -- OUTPUTS: O2 consumption: 256.84  Outputs:  -- OUTPUTS: Vo2 Indexed: 125.00  Outputs:  Intervention  Outputs:  -- OUTPUTS: O2 consumption: 256.84  Outputs:  -- OUTPUTS: Vo2 Indexed: 125.00  Outputs:  Post Angio  Outputs:  -- OUTPUTS: O2 consumption: 256.84  Outputs:  -- OUTPUTS: Vo2 Indexed: 125.00    < end of copied text > Patient seen and evaluated at bedside    Chief Complaint:    HPI:  Patient is a 57 y/o male  with a history of HTN, HLD, CAD s/p multiple stents, tobacco use who presents to the ED due to lightheadedness. Patient states that he initially felt fine this morning when he woke up this am  and was making  breakfast when he started to have  palpitations and had some  chest pain. He started to feel lightheaded, diaphoretic, and started to feel like he was blacking out He remained standing during this time and then went to sit down in a chair and rest of a bit and started to feel better He denies diplopia, paresthesias, focal weakness, changes in speech. (2019 10:58)    BEDSIDE: At bedside denies cp, SOB, orthopnea, palpitations, abdominal pain, fevers, chills, nausea, vomiting. Reports that over the past year would have intermittent cp exacerbated by positioning, activity and would last 1-2 hours and subside on its own without intervention. Denies cp w/ walking up a flight of stairs are walking on the street. Also reports intermittent SOB and palpitations karl when lying down over the past year. Denies any recent sick contacts, fevers, chills, had recent travel to [place] but denies hx of blood clots.       PMHx:   Ischemic cardiomyopathy  Stented coronary artery  Hyperlipidemia  MI, old  HTN (hypertension)      PSHx:   History of cardiac cath  No significant past surgical history      Allergies:  No Known Allergies      Home Meds:    Current Medications:   aspirin enteric coated 81 milliGRAM(s) Oral daily  atorvastatin 80 milliGRAM(s) Oral at bedtime  carvedilol 3.125 milliGRAM(s) Oral every 12 hours  heparin  Injectable 5000 Unit(s) SubCutaneous every 12 hours  isosorbide   mononitrate ER Tablet (IMDUR) 30 milliGRAM(s) Oral daily  lisinopril 5 milliGRAM(s) Oral daily  ticagrelor 90 milliGRAM(s) Oral two times a day      FAMILY HISTORY:  Family history of hypertension (Sibling)      Social History:  Smoking History: 8-10 cigars/day for 10 years  Alcohol Use: Socially consumes  Drug Use: Denies illicit or recreational drug use.     REVIEW OF SYSTEMS:  CONSTITUTIONAL: No weakness, fevers or chills  EYES/ENT: No visual changes;  No dysphagia  NECK: No pain or stiffness  RESPIRATORY: No cough, wheezing, hemoptysis; (+) SOB  CARDIOVASCULAR: (+) chest pain (+)palpitations; No lower extremity edema  GASTROINTESTINAL: No abdominal or epigastric pain. No nausea, vomiting, or hematemesis; No diarrhea or constipation. No melena or hematochezia.  BACK: No back pain  GENITOURINARY: No dysuria, frequency or hematuria  NEUROLOGICAL: No numbness or weakness  SKIN: No itching, burning, rashes, or lesions   All other review of systems is negative unless indicated above.    Physical Exam:  T(F): 97.9 (), Max: 97.9 ()  HR: 72 () (72 - 76)  BP: 149/88 () (149/88 - 186/99)  RR: 19 ()  SpO2: 98% ()  GENERAL: No acute distress, well-developed  HEAD:  Atraumatic, Normocephalic  ENT: EOMI, PERRLA, conjunctiva and sclera clear, Neck supple, No JVD, moist mucosa  CHEST/LUNG: Clear to auscultation bilaterally; No wheeze, equal breath sounds bilaterally   BACK: No spinal tenderness  HEART: Regular rate and rhythm; No murmurs, rubs, or gallops  ABDOMEN: Soft, Nontender, Nondistended; Bowel sounds present  EXTREMITIES:  No clubbing, cyanosis, or edema  PSYCH: Nl behavior, nl affect  NEUROLOGY: AAOx3, non-focal, cranial nerves intact  SKIN: Normal color, No rashes or lesions  LINES:    Cardiovascular Diagnostic Testing:    CXR: Personally reviewed    Labs: Personally reviewed                        15.4   12.3  )-----------( 212      ( 2019 09:16 )             44.9         138  |  104  |  12  ----------------------------<  185<H>  4.1   |  21<L>  |  0.70    Ca    9.4      2019 09:16    TPro  6.8  /  Alb  4.6  /  TBili  0.6  /  DBili  x   /  AST  15  /  ALT  23  /  AlkPhos  79  -    PT/INR - ( 2019 09:16 )   PT: 10.5 sec;   INR: 0.92 ratio         PTT - ( 2019 09:16 )  PTT:30.5 sec    CARDIAC MARKERS ( 2019 10:32 )  10 ng/L / x     / x     / x     / x     / x      CARDIAC MARKERS ( 2019 09:16 )  11 ng/L / x     / x     / x     / x     / x        < from: Transthoracic Echocardiogram (17 @ 09:38) >  Ejection Fraction (Teicholtz): 67 %  ------------------------------------------------------------------------  OBSERVATIONS:  Mitral Valve: Normal mitral valve. Mild mitral  regurgitation.  Aortic Root: Normal aortic root.  Aortic Valve: Normal trileaflet aortic valve.  Left Atrium: Normal left atrium.  Left Ventricle: Mild segmental left ventricular systolic  dysfunction the distal anteroseptum appears hypokinetic.  Overall EF appears preserved. Normal left ventricular  internal dimensions and wall thicknesses. Mild diastolic  dysfunction (Stage I).  Right Heart: Normal right atrium. Normal right ventricular  size and function. Normal tricuspid valve. Normal pulmonic  valve.  Pericardium/PleuraNormal pericardium with no pericardial  effusion.  ------------------------------------------------------------------------  CONCLUSIONS:  1. Normal left ventricular internal dimensions and wall  thicknesses.  2. Mild segmental left ventricular systolic dysfunction the  distal anteroseptum appears hypokinetic. Overall EF appears  preserved.  3. Mild diastolic dysfunction (Stage I).  4. Normal right ventricular size and function.  *** Compared with echocardiogram of 10/11/2016, the LV  function appears improved.    < end of copied text >        < from: Cardiac Cath Lab - Adult (17 @ 11:15) >  VENTRICLES: Global left ventricular function was borderline normal. EF  estimated was 50 %.  CORONARY VESSELS: The coronary circulation is right dominant.  LM:   --  LM: Normal.  LAD:   --  Proximal LAD: There was a discrete 20 % stenosis at the site of  a prior stent.  --  Mid LAD: There was a discrete 20 % stenosis at the site of a prior  stent, in themiddle third of the vessel segment. In a second lesion,  there was a discrete 60 % stenosis in the distal third of the vessel  segment. There was VASILIY grade 3 flow through the vessel (brisk flow).  CX:   --  Mid circumflex: The vessel was very small sized. There was a  tubular 40 % stenosis.  --  OM1: Angiography showed minor luminal irregularities with no flow  limiting lesions. There was a tubular 30 % stenosis in the proximal third  of the vessel segment.  RCA:   --  RCA: Angiography showed minor luminal irregularities with no  flow limiting lesions.  --  Mid RCA: There was a discrete 20 % stenosis.  --  Distal RCA: There was a discrete 20 % stenosis.  COMPLICATIONS: There were no complications.  SUMMARY:  1ST LESION INTERVENTIONS: A successful balloon angioplasty with stent was  performed on the 60 % lesion in the mid LAD. Following intervention there  was a 1 % residual stenosis.  DIAGNOSTIC RECOMMENDATIONS: PCI of mid LAD lesion for treatment of unstable  angina.  INTERVENTIONAL RECOMMENDATIONS: Continue aspirin, 81 mg, PO, daily.  Continue ticagrelor 90 mg twice daily. Patient management should include  aggressive medical therapy.  Prepared and signed by  Sidney Ellison M.D.  Signed 2017 20:35:36  HEMODYNAMIC TABLES  Pressures:  Baseline  Pressures:  - HR: 70  Pressures:  - Rhythm:  Pressures:  -- Aortic Pressure (S/D/M): 128/70/91  Pressures:  -- Left Ventricle (s/edp): 124/15/--  Pressures:  Post Angio  Pressures:  - HR: 66  Pressures:  - Rhythm:  Pressures:  -- Aortic Pressure (S/D/M): 123/69/91  Pressures:  -- Left Ventricle (s/edp): 115/19/--  Outputs:  Baseline  Outputs:  -- CALCULATIONS[age]  Outputs:  -- CALCULATIONS: Body Surface Area: 2.05  Outputs:  -- CALCULATIONS: Height in cm: 174.00  Outputs:  -- CALCULATIONS: Sex: Male  Outputs:  -- CALCULATIONS: Weight in k.70  Outputs:  -- OUTPUTS: O2 consumption: 256.84  Outputs:  -- OUTPUTS: Vo2 Indexed: 125.00  Outputs:  Intervention  Outputs:  -- OUTPUTS: O2 consumption: 256.84  Outputs:  -- OUTPUTS: Vo2 Indexed: 125.00  Outputs:  Post Angio  Outputs:  -- OUTPUTS: O2 consumption: 256.84  Outputs:  -- OUTPUTS: Vo2 Indexed: 125.00    < end of copied text >

## 2019-06-03 NOTE — CONSULT NOTE ADULT - ASSESSMENT
58M with a history of HTN, HLD, CAD s/p multiple stents, tobacco use who presents to the ED due to lightheadedness. 58M with a history of HTN, HLD, CAD s/p multiple stents, tobacco use who presents to the ED due to lightheadedness concern for ACS. 58M with a history of HTN, HLD, CAD s/p multiple stents, tobacco use who presents to the ED due to lightheadedness concern for ACS.  ·	Unlikely ACS given symptoms, recent stress and negative hs-trop X 3.   ·	Patient notes frequent episodes almost monthly. Generally occur with standing or position changes. Preceded by palpitations. Suspect this is vagally mediated vs orthostatic in the setting of dehydration and vasodilator use.   ·	No significant arrhythmia on tele.   ·	Encourage PO intake of fluids. Caution with position changes.   ·	Patient have echo and event monitor but this can be done outpatient.   ·	Continue present medications for now except can stop brillinta given patient is over one year out.

## 2019-06-03 NOTE — H&P ADULT - NSICDXFAMILYHX_GEN_ALL_CORE_FT
FAMILY HISTORY:  Sibling  Still living? Unknown  Family history of hypertension, Age at diagnosis: Age Unknown

## 2019-06-03 NOTE — H&P ADULT - NSHPLABSRESULTS_GEN_ALL_CORE
15.4   12.3  )-----------( 212      ( 03 Jun 2019 09:16 )             44.9       06-03    138  |  104  |  12  ----------------------------<  185<H>  4.1   |  21<L>  |  0.70    Ca    9.4      03 Jun 2019 09:16    TPro  6.8  /  Alb  4.6  /  TBili  0.6  /  DBili  x   /  AST  15  /  ALT  23  /  AlkPhos  79  06-03                  PT/INR - ( 03 Jun 2019 09:16 )   PT: 10.5 sec;   INR: 0.92 ratio         PTT - ( 03 Jun 2019 09:16 )  PTT:30.5 sec    Lactate Trend            CAPILLARY BLOOD GLUCOSE      POCT Blood Glucose.: 167 mg/dL (03 Jun 2019 09:35)

## 2019-06-03 NOTE — ED PROVIDER NOTE - PROGRESS NOTE DETAILS
dre pgy1: Neuro states no need for intervention at this time, states no need for intervention for possible posterior circulation issues, will admit for MRI and cardiac workup

## 2019-06-03 NOTE — ED PROVIDER NOTE - CLINICAL SUMMARY MEDICAL DECISION MAKING FREE TEXT BOX
VASYL Do MD: Pt is a 59 y/o male with PMH of HTN, HLD, CAD x3 stent 4 years ago, active smoker, p/w CC of neck pain and dizziness. Pt states that sx began 2 hrs PTA while making breakfast. Pt felt palpitations and "blacked out" losing consciousness. Pt had CP that started during this episode that has continued through now, mild. Denies F/C, cough, N/V/D, focal numbness/weakness, speech disturbance. States that he feels unsteady on his feet. On neuro exam, pt failed tandem gait - became very unsteady. Given neck pain, new neurologic deficit, concern for aortic dissection vs. SAH vs. CVA. CODE STROKE was called after examination, pt sent for CTA head/neck, non-con CT, and emergent Neurology consult. VASYL Do MD: Pt is a 57 y/o male with PMH of HTN, HLD, CAD x3 stents 4 years ago, active smoker, p/w CC of neck pain and dizziness. Pt states that sx began 2 hrs PTA while making breakfast when he felt palpitations and "blacked out" losing consciousness. Pt had CP that started during this episode that has continued through now, mild. Denies F/C, cough, N/V/D, focal numbness/weakness, speech disturbance. States that he feels unsteady on his feet. On neuro exam, pt failed tandem gait - became very unsteady. Given neck pain, new neurologic deficit, concern for aortic dissection vs. SAH vs. CVA. CODE STROKE was called after examination, pt sent for CTA head/neck, non-con CT, and emergent Neurology consult.

## 2019-06-03 NOTE — ED PROVIDER NOTE - PHYSICAL EXAMINATION
General: well appearing, interactive, well nourished, NAD  HEENT: pupils equal and reactive, normal mucosa, normal oropharynx, no lesions on the lips or on oral mucosa, normal external ears bilaterally   Resp: lungs clear to auscultation bilaterally, symmetric chest wall rise  Abd: soft, nontender, nondistended, normoactive bowel sounds  : no CVA tenderness  Neuro: Moving all extremities, CN 2-12 intact, finger to nose negative, Unable to perform tandem gait (falls over)   Skin:  normal color for race

## 2019-06-03 NOTE — ED PROVIDER NOTE - OBJECTIVE STATEMENT
58 yr old male with hx of HTN, HLD, CAD x3 stent 4 years ago, active smoker pw cc of     Was making breakfast,  felt palpitations and "blacked out" losing consciousness did not fall, CP that started during this episode that has continued through now.  Was SOB, sat down after this, felt better after this. not worse with exertion.   Came back from [place] 2 weeks ago, no hx of blood clots, No trauma  No F/C, no cough,   No N/V/D.     Meds: atorvastatin, brilinta, aspirin, carvedilol, metformin, lisinopril, isosorbide  5 pack years   NKDA  PCP Douglas Elmore @ [facility], Cardiologist Sidney PRADO

## 2019-06-03 NOTE — CONSULT NOTE ADULT - SUBJECTIVE AND OBJECTIVE BOX
HPI:    Patient is a 58M with a history of HTN, HLD, CAD s/p multiple stents, tobacco use who presents to the ED due to lightheadedness. Patient states that he initially felt fine this morning when he woke up and then around 7:30 am he was cooking breakfast when he started to notice palpitations and had some vague chest pain. He started to feel very lightheaded, diaphoretic, and started to feel like he was blacking out for a couple minutes. He remained standing during this time and then went to sit down in a chair and rest of a bit and started to feel better. He currently is complaining of some neck pain posteriorly and feels generally weak. He denies diplopia, paresthesias, focal weakness, changes in speech. He takes aspirin, Brilinta, and atorvastatin due to his cardiac history.       PAST MEDICAL & SURGICAL HISTORY:  Ischemic cardiomyopathy  Stented coronary artery  Hyperlipidemia  MI, old  HTN (hypertension)  History of cardiac cath    FAMILY HISTORY:  Family history of hypertension (Sibling)    Allergies    No Known Allergies    Social hx  - current smoker       Vital Signs Last 24 Hrs  T(C): 36.6 (03 Jun 2019 08:36), Max: 36.6 (03 Jun 2019 08:36)  T(F): 97.9 (03 Jun 2019 08:36), Max: 97.9 (03 Jun 2019 08:36)  HR: 72 (03 Jun 2019 08:49) (72 - 76)  BP: 149/88 (03 Jun 2019 08:49) (149/88 - 186/99)  BP(mean): --  RR: 19 (03 Jun 2019 08:49) (19 - 19)  SpO2: 98% (03 Jun 2019 08:49) (97% - 98%)    General Exam:   General appearance: No acute distress    Neurological Exam:  Mental Status: Orientated to self, date and place.  Attention intact.  No dysarthria. Speech fluent.  Cranial Nerves:   PERRL, EOMI, VFF, no nystagmus.    CN V1-3 intact to light touch .  No facial asymmetry.   Motor:   Tone: normal.                  Strength:     [] Upper extremity                      Delt       Bicep    Tricep                                                  R         5/5        5/5        5/5       5/5                                               L          5/5        5/5        5/5       5/5  [] Lower extremity                       HF          KE          KF        DF         PF                                               R        5/5        5/5        5/5       5/5       5/5                                               L         5/5        5/5       5/5       5/5 5/5  Pronator drift: none                 Dysmetria: None to finger-nose-finger    Sensation: intact to light touch    Gait: normal gait however very unsteady in heel-toe-heel    06-03    138  |  104  |  12  ----------------------------<  185<H>  4.1   |  21<L>  |  0.70    Ca    9.4      03 Jun 2019 09:16    TPro  6.8  /  Alb  4.6  /  TBili  0.6  /  DBili  x   /  AST  15  /  ALT  23  /  AlkPhos  79  06-03                            15.4   12.3  )-----------( 212      ( 03 Jun 2019 09:16 )             44.9       Radiology    < from: CT Head No Cont (06.03.19 @ 09:53) >  IMPRESSION: Normal brain CT. Normal CTA of the head and neck. No evidence   of venous thrombosis.  < end of copied text >

## 2019-06-04 ENCOUNTER — INBOUND DOCUMENT (OUTPATIENT)
Age: 59
End: 2019-06-04

## 2019-06-04 ENCOUNTER — TRANSCRIPTION ENCOUNTER (OUTPATIENT)
Age: 59
End: 2019-06-04

## 2019-06-04 VITALS
TEMPERATURE: 98 F | SYSTOLIC BLOOD PRESSURE: 128 MMHG | HEART RATE: 77 BPM | RESPIRATION RATE: 18 BRPM | OXYGEN SATURATION: 96 % | WEIGHT: 196.21 LBS | DIASTOLIC BLOOD PRESSURE: 78 MMHG

## 2019-06-04 LAB
ANION GAP SERPL CALC-SCNC: 13 MMOL/L — SIGNIFICANT CHANGE UP (ref 5–17)
BUN SERPL-MCNC: 15 MG/DL — SIGNIFICANT CHANGE UP (ref 7–23)
CALCIUM SERPL-MCNC: 9.2 MG/DL — SIGNIFICANT CHANGE UP (ref 8.4–10.5)
CHLORIDE SERPL-SCNC: 100 MMOL/L — SIGNIFICANT CHANGE UP (ref 96–108)
CO2 SERPL-SCNC: 21 MMOL/L — LOW (ref 22–31)
CREAT SERPL-MCNC: 0.69 MG/DL — SIGNIFICANT CHANGE UP (ref 0.5–1.3)
GLUCOSE BLDC GLUCOMTR-MCNC: 175 MG/DL — HIGH (ref 70–99)
GLUCOSE SERPL-MCNC: 187 MG/DL — HIGH (ref 70–99)
HBA1C BLD-MCNC: 7.8 % — HIGH (ref 4–5.6)
HCT VFR BLD CALC: 41.9 % — SIGNIFICANT CHANGE UP (ref 39–50)
HGB BLD-MCNC: 14.4 G/DL — SIGNIFICANT CHANGE UP (ref 13–17)
MCHC RBC-ENTMCNC: 32.9 PG — SIGNIFICANT CHANGE UP (ref 27–34)
MCHC RBC-ENTMCNC: 34.4 GM/DL — SIGNIFICANT CHANGE UP (ref 32–36)
MCV RBC AUTO: 95.7 FL — SIGNIFICANT CHANGE UP (ref 80–100)
PLATELET # BLD AUTO: 171 K/UL — SIGNIFICANT CHANGE UP (ref 150–400)
POTASSIUM SERPL-MCNC: 3.9 MMOL/L — SIGNIFICANT CHANGE UP (ref 3.5–5.3)
POTASSIUM SERPL-SCNC: 3.9 MMOL/L — SIGNIFICANT CHANGE UP (ref 3.5–5.3)
RBC # BLD: 4.38 M/UL — SIGNIFICANT CHANGE UP (ref 4.2–5.8)
RBC # FLD: 11.5 % — SIGNIFICANT CHANGE UP (ref 10.3–14.5)
SODIUM SERPL-SCNC: 134 MMOL/L — LOW (ref 135–145)
WBC # BLD: 6.6 K/UL — SIGNIFICANT CHANGE UP (ref 3.8–10.5)
WBC # FLD AUTO: 6.6 K/UL — SIGNIFICANT CHANGE UP (ref 3.8–10.5)

## 2019-06-04 PROCEDURE — 93005 ELECTROCARDIOGRAM TRACING: CPT

## 2019-06-04 PROCEDURE — 82962 GLUCOSE BLOOD TEST: CPT

## 2019-06-04 PROCEDURE — 70496 CT ANGIOGRAPHY HEAD: CPT

## 2019-06-04 PROCEDURE — 70450 CT HEAD/BRAIN W/O DYE: CPT

## 2019-06-04 PROCEDURE — 85027 COMPLETE CBC AUTOMATED: CPT

## 2019-06-04 PROCEDURE — 83036 HEMOGLOBIN GLYCOSYLATED A1C: CPT

## 2019-06-04 PROCEDURE — 70498 CT ANGIOGRAPHY NECK: CPT

## 2019-06-04 PROCEDURE — 80053 COMPREHEN METABOLIC PANEL: CPT

## 2019-06-04 PROCEDURE — 84484 ASSAY OF TROPONIN QUANT: CPT

## 2019-06-04 PROCEDURE — 36415 COLL VENOUS BLD VENIPUNCTURE: CPT

## 2019-06-04 PROCEDURE — 80048 BASIC METABOLIC PNL TOTAL CA: CPT

## 2019-06-04 PROCEDURE — 85730 THROMBOPLASTIN TIME PARTIAL: CPT

## 2019-06-04 PROCEDURE — 99285 EMERGENCY DEPT VISIT HI MDM: CPT

## 2019-06-04 PROCEDURE — 71046 X-RAY EXAM CHEST 2 VIEWS: CPT

## 2019-06-04 PROCEDURE — 85610 PROTHROMBIN TIME: CPT

## 2019-06-04 PROCEDURE — 99232 SBSQ HOSP IP/OBS MODERATE 35: CPT | Mod: GC

## 2019-06-04 RX ORDER — LISINOPRIL 2.5 MG/1
1 TABLET ORAL
Qty: 0 | Refills: 0 | DISCHARGE
Start: 2019-06-04

## 2019-06-04 RX ORDER — ISOSORBIDE MONONITRATE 60 MG/1
1 TABLET, EXTENDED RELEASE ORAL
Qty: 0 | Refills: 0 | DISCHARGE
Start: 2019-06-04

## 2019-06-04 RX ADMIN — CARVEDILOL PHOSPHATE 3.12 MILLIGRAM(S): 80 CAPSULE, EXTENDED RELEASE ORAL at 05:44

## 2019-06-04 RX ADMIN — LISINOPRIL 5 MILLIGRAM(S): 2.5 TABLET ORAL at 05:44

## 2019-06-04 RX ADMIN — HEPARIN SODIUM 5000 UNIT(S): 5000 INJECTION INTRAVENOUS; SUBCUTANEOUS at 05:44

## 2019-06-04 NOTE — PROGRESS NOTE ADULT - ASSESSMENT
pt w/ significant cad / sscp/ dizziness/  r/o acs  cards eval noted  trops nrg  cleared for d/c by cards  dvt proph  c/w outpt meds  neuro eval noted   cleared for d/c     \

## 2019-06-04 NOTE — PROGRESS NOTE ADULT - SUBJECTIVE AND OBJECTIVE BOX
24H hour events: No acute events overnight. Pt anxious to go home.     MEDICATIONS:  aspirin enteric coated 81 milliGRAM(s) Oral daily  carvedilol 3.125 milliGRAM(s) Oral every 12 hours  heparin  Injectable 5000 Unit(s) SubCutaneous every 12 hours  isosorbide   mononitrate ER Tablet (IMDUR) 30 milliGRAM(s) Oral daily  lisinopril 5 milliGRAM(s) Oral daily            atorvastatin 80 milliGRAM(s) Oral at bedtime  dextrose 40% Gel 15 Gram(s) Oral once PRN  dextrose 50% Injectable 12.5 Gram(s) IV Push once  dextrose 50% Injectable 25 Gram(s) IV Push once  dextrose 50% Injectable 25 Gram(s) IV Push once  glucagon  Injectable 1 milliGRAM(s) IntraMuscular once PRN  insulin lispro (HumaLOG) corrective regimen sliding scale   SubCutaneous three times a day before meals    dextrose 5%. 1000 milliLiter(s) IV Continuous <Continuous>        PHYSICAL EXAM:  T(C): 36.6 (06-04-19 @ 04:27), Max: 37.1 (06-03-19 @ 19:49)  HR: 77 (06-04-19 @ 04:27) (67 - 85)  BP: 128/78 (06-04-19 @ 04:27) (119/72 - 141/76)  RR: 18 (06-04-19 @ 04:27) (17 - 18)  SpO2: 96% (06-04-19 @ 04:27) (94% - 98%)  I&O's Summary    03 Jun 2019 07:01  -  04 Jun 2019 07:00  --------------------------------------------------------  IN: 0 mL / OUT: 320 mL / NET: -320 mL        Appearance: Normal	  HEENT:   Normal oral mucosa  Cardiovascular: normal rate, regular rhythm, audible S1 and S2, no murmurs, rubs, or gallops, no JVD, no edema  Respiratory: Lungs clear to auscultation	  Psychiatry: Mood & affect appropriate  Gastrointestinal:  Soft  Skin: No rashes, No ecchymoses, No cyanosis	  Neurologic: Non-focal  Extremities: No clubbing, cyanosis or edema  Vascular: Peripheral pulses palpable         LABS:	 	    CBC Full  -  ( 04 Jun 2019 06:21 )  WBC Count : 6.6 K/uL  Hemoglobin : 14.4 g/dL  Hematocrit : 41.9 %  Platelet Count - Automated : 171 K/uL  Mean Cell Volume : 95.7 fl  Mean Cell Hemoglobin : 32.9 pg  Mean Cell Hemoglobin Concentration : 34.4 gm/dL  Auto Neutrophil # : x  Auto Lymphocyte # : x  Auto Monocyte # : x  Auto Eosinophil # : x  Auto Basophil # : x  Auto Neutrophil % : x  Auto Lymphocyte % : x  Auto Monocyte % : x  Auto Eosinophil % : x  Auto Basophil % : x    06-04    134<L>  |  100  |  15  ----------------------------<  187<H>  3.9   |  21<L>  |  0.69  06-03    138  |  104  |  12  ----------------------------<  185<H>  4.1   |  21<L>  |  0.70    Ca    9.2      04 Jun 2019 06:21  Ca    9.4      03 Jun 2019 09:16    TPro  6.8  /  Alb  4.6  /  TBili  0.6  /  DBili  x   /  AST  15  /  ALT  23  /  AlkPhos  79  06-03      proBNP:   Lipid Profile:   HgA1c: Hemoglobin A1C, Whole Blood: 7.8 % (06-04-19 @ 09:14)      TELEMETRY: Sinus Rhythm in 60s - 70s  	    	  	  ASSESSMENT/PLAN: 58M with a history of HTN, HLD, CAD s/p multiple stents, tobacco use who presents to the ED due to lightheadedness concern for ACS.  ·	Unlikely ACS given symptoms, recent stress and negative hs-trop X 3.   ·	Patient notes frequent episodes almost monthly. Generally occur with standing or position changes. Preceded by palpitations. Suspect this is vagally mediated vs orthostatic in the setting of dehydration and vasodilator use.   ·	No significant arrhythmia on tele.   ·	Encourage PO intake of fluids. Caution with position changes.   ·	Patient have echo and event monitor but this can be done outpatient.   Continue present medications for now except can stop brillinta given patient is over one year out. 	    No objection to discharge from cardiac standpoint with plan outlinted as above.     Remberto Lindquist  Cardiology Fellow  Please feel free to contact me at [phone] (text or call) at any time.   NOTE: All Cardiology Service and Contact information can now be found at [email or web address], password: unique 24H hour events: No acute events overnight. Pt anxious to go home.     MEDICATIONS:  aspirin enteric coated 81 milliGRAM(s) Oral daily  carvedilol 3.125 milliGRAM(s) Oral every 12 hours  heparin  Injectable 5000 Unit(s) SubCutaneous every 12 hours  isosorbide   mononitrate ER Tablet (IMDUR) 30 milliGRAM(s) Oral daily  lisinopril 5 milliGRAM(s) Oral daily            atorvastatin 80 milliGRAM(s) Oral at bedtime  dextrose 40% Gel 15 Gram(s) Oral once PRN  dextrose 50% Injectable 12.5 Gram(s) IV Push once  dextrose 50% Injectable 25 Gram(s) IV Push once  dextrose 50% Injectable 25 Gram(s) IV Push once  glucagon  Injectable 1 milliGRAM(s) IntraMuscular once PRN  insulin lispro (HumaLOG) corrective regimen sliding scale   SubCutaneous three times a day before meals    dextrose 5%. 1000 milliLiter(s) IV Continuous <Continuous>        PHYSICAL EXAM:  T(C): 36.6 (06-04-19 @ 04:27), Max: 37.1 (06-03-19 @ 19:49)  HR: 77 (06-04-19 @ 04:27) (67 - 85)  BP: 128/78 (06-04-19 @ 04:27) (119/72 - 141/76)  RR: 18 (06-04-19 @ 04:27) (17 - 18)  SpO2: 96% (06-04-19 @ 04:27) (94% - 98%)  I&O's Summary    03 Jun 2019 07:01  -  04 Jun 2019 07:00  --------------------------------------------------------  IN: 0 mL / OUT: 320 mL / NET: -320 mL    REVIEW OF SYSTEMS:  CONSTITUTIONAL: No weakness, fevers or chills  EYES/ENT: No visual changes;  No dysphagia  NECK: No pain or stiffness  RESPIRATORY: No cough, wheezing, hemoptysis;  CARDIOVASCULAR: No chest pain, No lower extremity edema  GASTROINTESTINAL: No abdominal or epigastric pain. No nausea, vomiting, or hematemesis; No diarrhea or constipation. No melena or hematochezia.  BACK: No back pain  GENITOURINARY: No dysuria, frequency or hematuria  NEUROLOGICAL: No numbness or weakness  SKIN: No itching, burning, rashes, or lesions   All other review of systems is negative unless indicated above.    Appearance: Normal	  HEENT:   Normal oral mucosa  Cardiovascular: normal rate, regular rhythm, audible S1 and S2, no murmurs, rubs, or gallops, no JVD, no edema  Respiratory: Lungs clear to auscultation	  Psychiatry: Mood & affect appropriate  Gastrointestinal:  Soft  Skin: No rashes, No ecchymoses, No cyanosis	  Neurologic: Non-focal  Extremities: No clubbing, cyanosis or edema  Vascular: Peripheral pulses palpable         LABS:	 	    CBC Full  -  ( 04 Jun 2019 06:21 )  WBC Count : 6.6 K/uL  Hemoglobin : 14.4 g/dL  Hematocrit : 41.9 %  Platelet Count - Automated : 171 K/uL  Mean Cell Volume : 95.7 fl  Mean Cell Hemoglobin : 32.9 pg  Mean Cell Hemoglobin Concentration : 34.4 gm/dL  Auto Neutrophil # : x  Auto Lymphocyte # : x  Auto Monocyte # : x  Auto Eosinophil # : x  Auto Basophil # : x  Auto Neutrophil % : x  Auto Lymphocyte % : x  Auto Monocyte % : x  Auto Eosinophil % : x  Auto Basophil % : x    06-04    134<L>  |  100  |  15  ----------------------------<  187<H>  3.9   |  21<L>  |  0.69  06-03    138  |  104  |  12  ----------------------------<  185<H>  4.1   |  21<L>  |  0.70    Ca    9.2      04 Jun 2019 06:21  Ca    9.4      03 Jun 2019 09:16    TPro  6.8  /  Alb  4.6  /  TBili  0.6  /  DBili  x   /  AST  15  /  ALT  23  /  AlkPhos  79  06-03      proBNP:   Lipid Profile:   HgA1c: Hemoglobin A1C, Whole Blood: 7.8 % (06-04-19 @ 09:14)      TELEMETRY: Sinus Rhythm in 60s - 70s  	    	  	  ASSESSMENT/PLAN: 58M with a history of HTN, HLD, CAD s/p multiple stents, tobacco use who presents to the ED due to lightheadedness concern for ACS.  ·	Unlikely ACS given symptoms, recent stress and negative hs-trop X 3.   ·	Patient notes frequent episodes almost monthly. Generally occur with standing or position changes. Preceded by palpitations. Suspect this is vagally mediated vs orthostatic in the setting of dehydration and vasodilator use.   ·	No significant arrhythmia on tele.   ·	Encourage PO intake of fluids. Caution with position changes.   ·	Patient have echo and event monitor but this can be done outpatient.   Continue present medications for now except can stop brillinta given patient is over one year out. 	    No objection to discharge from cardiac standpoint with plan outlinted as above.     Remberto Lindquist  Cardiology Fellow  Please feel free to contact me at [phone] (text or call) at any time.   NOTE: All Cardiology Service and Contact information can now be found at [email or web address], password: unique 24H hour events: No acute events overnight. Pt anxious to go home.     MEDICATIONS:  aspirin enteric coated 81 milliGRAM(s) Oral daily  carvedilol 3.125 milliGRAM(s) Oral every 12 hours  heparin  Injectable 5000 Unit(s) SubCutaneous every 12 hours  isosorbide   mononitrate ER Tablet (IMDUR) 30 milliGRAM(s) Oral daily  lisinopril 5 milliGRAM(s) Oral daily  atorvastatin 80 milliGRAM(s) Oral at bedtime  dextrose 40% Gel 15 Gram(s) Oral once PRN  dextrose 50% Injectable 12.5 Gram(s) IV Push once  dextrose 50% Injectable 25 Gram(s) IV Push once  dextrose 50% Injectable 25 Gram(s) IV Push once  glucagon  Injectable 1 milliGRAM(s) IntraMuscular once PRN  insulin lispro (HumaLOG) corrective regimen sliding scale   SubCutaneous three times a day before meals  dextrose 5%. 1000 milliLiter(s) IV Continuous <Continuous>    PHYSICAL EXAM:  T(C): 36.6 (06-04-19 @ 04:27), Max: 37.1 (06-03-19 @ 19:49)  HR: 77 (06-04-19 @ 04:27) (67 - 85)  BP: 128/78 (06-04-19 @ 04:27) (119/72 - 141/76)  RR: 18 (06-04-19 @ 04:27) (17 - 18)  SpO2: 96% (06-04-19 @ 04:27) (94% - 98%)  I&O's Summary    03 Jun 2019 07:01  -  04 Jun 2019 07:00  --------------------------------------------------------  IN: 0 mL / OUT: 320 mL / NET: -320 mL    REVIEW OF SYSTEMS:  CONSTITUTIONAL: No weakness, fevers or chills  EYES/ENT: No visual changes;  No dysphagia  NECK: No pain or stiffness  RESPIRATORY: No cough, wheezing, hemoptysis;  CARDIOVASCULAR: No chest pain, No lower extremity edema  GASTROINTESTINAL: No abdominal or epigastric pain. No nausea, vomiting, or hematemesis; No diarrhea or constipation. No melena or hematochezia.  BACK: No back pain  GENITOURINARY: No dysuria, frequency or hematuria  NEUROLOGICAL: No numbness or weakness  SKIN: No itching, burning, rashes, or lesions   All other review of systems is negative unless indicated above.    Appearance: Normal	  HEENT:   Normal oral mucosa  Cardiovascular: normal rate, regular rhythm, audible S1 and S2, no murmurs, rubs, or gallops, no JVD, no edema  Respiratory: Lungs clear to auscultation	  Psychiatry: Mood & affect appropriate  Gastrointestinal:  Soft  Skin: No rashes, No ecchymoses, No cyanosis	  Neurologic: Non-focal  Extremities: No clubbing, cyanosis or edema  Vascular: Peripheral pulses palpable         LABS:	 	    CBC Full  -  ( 04 Jun 2019 06:21 )  WBC Count : 6.6 K/uL  Hemoglobin : 14.4 g/dL  Hematocrit : 41.9 %  Platelet Count - Automated : 171 K/uL  Mean Cell Volume : 95.7 fl  Mean Cell Hemoglobin : 32.9 pg  Mean Cell Hemoglobin Concentration : 34.4 gm/dL  Auto Neutrophil # : x  Auto Lymphocyte # : x  Auto Monocyte # : x  Auto Eosinophil # : x  Auto Basophil # : x  Auto Neutrophil % : x  Auto Lymphocyte % : x  Auto Monocyte % : x  Auto Eosinophil % : x  Auto Basophil % : x    06-04    134<L>  |  100  |  15  ----------------------------<  187<H>  3.9   |  21<L>  |  0.69  06-03    138  |  104  |  12  ----------------------------<  185<H>  4.1   |  21<L>  |  0.70    Ca    9.2      04 Jun 2019 06:21  Ca    9.4      03 Jun 2019 09:16    TPro  6.8  /  Alb  4.6  /  TBili  0.6  /  DBili  x   /  AST  15  /  ALT  23  /  AlkPhos  79  06-03      proBNP:   Lipid Profile:   HgA1c: Hemoglobin A1C, Whole Blood: 7.8 % (06-04-19 @ 09:14)      TELEMETRY: Sinus Rhythm in 60s - 70s  	    	  	  ASSESSMENT/PLAN: 58M with a history of HTN, HLD, CAD s/p multiple stents, tobacco use who presents to the ED due to lightheadedness concern for ACS.  ·	Unlikely ACS given symptoms, recent stress and negative hs-trop X 3.   ·	Patient notes frequent episodes almost monthly. Generally occur with standing or position changes. Preceded by palpitations. Suspect this is vagally mediated vs orthostatic in the setting of dehydration and vasodilator use.   ·	No significant arrhythmia on tele.   ·	Encourage PO intake of fluids. Caution with position changes.   ·	Patient have echo and event monitor but this can be done outpatient.   Continue present medications for now except can stop brillinta given patient is over one year out. 	    No objection to discharge from cardiac standpoint with plan outlinted as above.     Remberto Lindquist  Cardiology Fellow  Please feel free to contact me at [phone] (text or call) at any time.   NOTE: All Cardiology Service and Contact information can now be found at [email or web address], password: unique

## 2019-06-04 NOTE — PROGRESS NOTE ADULT - SUBJECTIVE AND OBJECTIVE BOX
CHIEF COMPLAINT:Patient is a 58y old  Male who presents with a chief complaint of   	        PAST MEDICAL & SURGICAL HISTORY:  Ischemic cardiomyopathy  Stented coronary artery  Hyperlipidemia  MI, old  HTN (hypertension)  History of cardiac cath          REVIEW OF SYSTEMS:  CONSTITUTIONAL: No fever, weight loss, or fatigue  EYES: No eye pain, visual disturbances, or discharge  NECK: No pain or stiffness  RESPIRATORY: No cough, wheezing, chills or hemoptysis; No Shortness of Breath  CARDIOVASCULAR: No chest pain, palpitations, passing out, dizziness, or leg swelling  GASTROINTESTINAL: No abdominal or epigastric pain. No nausea, vomiting, or hematemesis; No diarrhea or constipation. No melena or hematochezia.  GENITOURINARY: No dysuria, frequency, hematuria, or incontinence  NEUROLOGICAL: No headaches, memory loss, loss of strength, numbness, or tremors  SKIN: No itching, burning, rashes, or lesions   LYMPH Nodes: No enlarged glands  ENDOCRINE: No heat or cold intolerance; No hair loss  MUSCULOSKELETAL: No joint pain or swelling; No muscle, back, or extremity pain    Medications:  MEDICATIONS  (STANDING):  aspirin enteric coated 81 milliGRAM(s) Oral daily  atorvastatin 80 milliGRAM(s) Oral at bedtime  carvedilol 3.125 milliGRAM(s) Oral every 12 hours  dextrose 5%. 1000 milliLiter(s) (50 mL/Hr) IV Continuous <Continuous>  dextrose 50% Injectable 12.5 Gram(s) IV Push once  dextrose 50% Injectable 25 Gram(s) IV Push once  dextrose 50% Injectable 25 Gram(s) IV Push once  heparin  Injectable 5000 Unit(s) SubCutaneous every 12 hours  insulin lispro (HumaLOG) corrective regimen sliding scale   SubCutaneous three times a day before meals  isosorbide   mononitrate ER Tablet (IMDUR) 30 milliGRAM(s) Oral daily  lisinopril 5 milliGRAM(s) Oral daily    MEDICATIONS  (PRN):  dextrose 40% Gel 15 Gram(s) Oral once PRN Blood Glucose LESS THAN 70 milliGRAM(s)/deciliter  glucagon  Injectable 1 milliGRAM(s) IntraMuscular once PRN Glucose LESS THAN 70 milligrams/deciliter    	    PHYSICAL EXAM:  T(C): 36.6 (06-04-19 @ 04:27), Max: 37.1 (06-03-19 @ 19:49)  HR: 77 (06-04-19 @ 04:27) (67 - 85)  BP: 128/78 (06-04-19 @ 04:27) (119/72 - 141/76)  RR: 18 (06-04-19 @ 04:27) (17 - 18)  SpO2: 96% (06-04-19 @ 04:27) (94% - 98%)  Wt(kg): --  I&O's Summary    03 Jun 2019 07:01  -  04 Jun 2019 07:00  --------------------------------------------------------  IN: 0 mL / OUT: 320 mL / NET: -320 mL        Appearance: Normal	  HEENT:   Normal oral mucosa, PERRL, EOMI	  Lymphatic: No lymphadenopathy  Cardiovascular: Normal S1 S2, No JVD, No murmurs, No edema  Respiratory: Lungs clear to auscultation	  Psychiatry: A & O x 3, Mood & affect appropriate  Gastrointestinal:  Soft, Non-tender, + BS	  Skin: No rashes, No ecchymoses, No cyanosis	  Neurologic: Non-focal  Extremities: Normal range of motion, No clubbing, cyanosis or edema  Vascular: Peripheral pulses palpable 2+ bilaterally    TELEMETRY: 	    ECG:  	  RADIOLOGY:  OTHER: 	  	  LABS:	 	    CARDIAC MARKERS:                                14.4   6.6   )-----------( 171      ( 04 Jun 2019 06:21 )             41.9     06-04    134<L>  |  100  |  15  ----------------------------<  187<H>  3.9   |  21<L>  |  0.69    Ca    9.2      04 Jun 2019 06:21    TPro  6.8  /  Alb  4.6  /  TBili  0.6  /  DBili  x   /  AST  15  /  ALT  23  /  AlkPhos  79  06-03    proBNP:   Lipid Profile:   HgA1c:   TSH:

## 2019-06-04 NOTE — DISCHARGE NOTE PROVIDER - CARE PROVIDER_API CALL
Sidney Ellison (MD)  Cardiovascular Disease; Internal Medicine; Interventional Cardiology  [address] [address] [place] [address], [facility] [address] [place] [address]  Phone: [phone]  Fax: [phone]  Follow Up Time: 1 week

## 2019-06-04 NOTE — DISCHARGE NOTE NURSING/CASE MANAGEMENT/SOCIAL WORK - NSDCDPATPORTLINK_GEN_ALL_CORE
You can access the [facility] Patient Portal, offered by [facility], by registering with the following website: [email or web address]

## 2019-06-04 NOTE — DISCHARGE NOTE PROVIDER - HOSPITAL COURSE
Patient is a 59 y/o male  with a history of HTN, HLD, CAD s/p multiple stents, tobacco use who presents to the ED due to lightheadedness. Patient states that he initially felt fine this morning when he woke up this am  and was making  breakfast when he started to have  palpitations and had some  chest pain. He started to feel lightheaded, diaphoretic, and started to feel like he was blacking out. He remained standing during this time and then went to sit down in a chair and rest of a bit and started to feel better. He denies diplopia, paresthesias, focal weakness, changes in speech.  S/p Code Stroke in the ED. CT Angio Neck w/ IV Negative for venous thrombosis. Normal brain CT. NIHSS 0, MRS 0. C. Patient was not a tpa or endovascular candidate due to NIHSS of 0. recommended  for cardiac w/u for syncope per neuro. Pt seen by cardiology. Determine unlikely ACS given symptoms, recent stress and negative hs-trop X 3. Patient notes frequent episodes almost monthly. Generally occur with standing or position changes. Preceded by palpitations. Suspect this is vagally mediated vs orthostatic in the setting of dehydration and vasodilator use. Pt withouth any significant arrhythmia on tele. Pt is encouraged to increase PO intake of fluids. Caution with position changes.  Patient can follow up out patient for repeat echo and event monitor. Continue present medications for now except can stop brillinta given patient is over one year out. Pt is stable and cleared for discharge home by medical attending.

## 2019-06-04 NOTE — DISCHARGE NOTE PROVIDER - NSDCCPCAREPLAN_GEN_ALL_CORE_FT
PRINCIPAL DISCHARGE DIAGNOSIS  Diagnosis: Dizziness  Assessment and Plan of Treatment: Pt with complaints of feeling ligheaded at home. CT Head, CT Angio and CT vascular were within normal limits. Patient was not a tpa or endovascular candidate due to NIH stroke scale score of 0. Patient seen by cardiology and rulled out acute coronary syndrome. Recent stress and negative hs-trop X 3. Cardiology suspects this is vagally mediated vs orthostatic in the setting of dehydration and vasodilator use.   PT encouraged to increase PO intake of fluids and caution with position changes. Patient can  have  repeat echo and event monitor outpatient.   Continue present medications for now except can stop brillinta given patient is over one year out. 	        SECONDARY DISCHARGE DIAGNOSES  Diagnosis: Chest pain, unspecified type  Assessment and Plan of Treatment: HOME CARE INSTRUCTIONS  For the next few days, avoid physical activities that bring on chest pain. Continue physical activities as directed.  Do not smoke.  Avoid drinking alcohol.   Only take over-the-counter or prescription medicine for pain, discomfort, or fever as directed by your caregiver.  Follow your caregiver's suggestions for further testing if your chest pain does not go away.  Keep any follow-up appointments you made. If you do not go to an appointment, you could develop lasting (chronic) problems with pain. If there is any problem keeping an appointment, you must call to reschedule.   SEEK MEDICAL CARE IF:  You think you are having problems from the medicine you are taking. Read your medicine instructions carefully.  Your chest pain does not go away, even after treatment.  You develop a rash with blisters on your chest.  SEEK IMMEDIATE MEDICAL CARE IF:  You have increased chest pain or pain that spreads to your arm, neck, jaw, back, or abdomen.   You develop shortness of breath, an increasing cough, or you are coughing up blood.  You have severe back or abdominal pain, feel nauseous, or vomit.  You develop severe weakness, fainting, or chills.  You have a fever.  THIS IS AN EMERGENCY. Do not wait to see if the pain will go away. Get medical help at once. Call your local emergency services. Do not drive yourself to the hospital.

## 2019-07-09 ENCOUNTER — RX CHANGE (OUTPATIENT)
Age: 59
End: 2019-07-09

## 2019-07-29 ENCOUNTER — RX RENEWAL (OUTPATIENT)
Age: 59
End: 2019-07-29

## 2019-09-23 NOTE — ED PROVIDER NOTE - NSTIMEPROVIDERCAREINITIATE_GEN_ER
01-Jun-2017 14:42 Vital Signs: I have reviewed the initial vital signs.  Constitutional: NAD, well-nourished, appears stated age, no acute distress.  MSK: Neck supple, nontender, nl ROM, no stepoff. Chest nontender. Back nontender in TLS spine or to b/l bony structures or flanks. AC joint ttp. tender with rom of L shoulder, but with from. Ext otherwise nontender, nl rom, no deformity.   INTEG: Skin warm, dry, no rash, laceration, ecchymosis erythema or swelling  NEURO: A&Ox3, moving all extremities, normal speech. sensorineural intact distally  PSYCH: Calm, cooperative, normal affect and interaction.

## 2019-10-11 ENCOUNTER — RX CHANGE (OUTPATIENT)
Age: 59
End: 2019-10-11

## 2020-01-19 NOTE — ED ADULT NURSE NOTE - NSTOBACCO QUIT READY_GEN_A_CORE_SD
Extramural delivery pt states she received prenatal care with doctor at [facility].  Placenta was delivered in emergency room.  Pt transported to labor and delivery for recovery.     Samara Grimes MD
not motivated to quit

## 2020-01-21 ENCOUNTER — EMERGENCY (EMERGENCY)
Facility: HOSPITAL | Age: 60
LOS: 1 days | End: 2020-01-21
Attending: EMERGENCY MEDICINE
Payer: COMMERCIAL

## 2020-01-21 VITALS
TEMPERATURE: 98 F | HEART RATE: 75 BPM | DIASTOLIC BLOOD PRESSURE: 84 MMHG | WEIGHT: 199.96 LBS | SYSTOLIC BLOOD PRESSURE: 137 MMHG | OXYGEN SATURATION: 96 % | HEIGHT: 69 IN | RESPIRATION RATE: 18 BRPM

## 2020-01-21 VITALS
HEART RATE: 75 BPM | RESPIRATION RATE: 20 BRPM | DIASTOLIC BLOOD PRESSURE: 92 MMHG | OXYGEN SATURATION: 98 % | SYSTOLIC BLOOD PRESSURE: 158 MMHG | TEMPERATURE: 98 F

## 2020-01-21 DIAGNOSIS — Z98.890 OTHER SPECIFIED POSTPROCEDURAL STATES: Chronic | ICD-10-CM

## 2020-01-21 LAB
ALBUMIN SERPL ELPH-MCNC: 4.5 G/DL — SIGNIFICANT CHANGE UP (ref 3.3–5)
ALP SERPL-CCNC: 76 U/L — SIGNIFICANT CHANGE UP (ref 40–120)
ALT FLD-CCNC: 17 U/L — SIGNIFICANT CHANGE UP (ref 10–45)
ANION GAP SERPL CALC-SCNC: 15 MMOL/L — SIGNIFICANT CHANGE UP (ref 5–17)
APTT BLD: 31.9 SEC — SIGNIFICANT CHANGE UP (ref 27.5–36.3)
AST SERPL-CCNC: 12 U/L — SIGNIFICANT CHANGE UP (ref 10–40)
BILIRUB SERPL-MCNC: 0.3 MG/DL — SIGNIFICANT CHANGE UP (ref 0.2–1.2)
BUN SERPL-MCNC: 17 MG/DL — SIGNIFICANT CHANGE UP (ref 7–23)
CALCIUM SERPL-MCNC: 9.2 MG/DL — SIGNIFICANT CHANGE UP (ref 8.4–10.5)
CHLORIDE SERPL-SCNC: 99 MMOL/L — SIGNIFICANT CHANGE UP (ref 96–108)
CO2 SERPL-SCNC: 23 MMOL/L — SIGNIFICANT CHANGE UP (ref 22–31)
CREAT SERPL-MCNC: 0.74 MG/DL — SIGNIFICANT CHANGE UP (ref 0.5–1.3)
GLUCOSE SERPL-MCNC: 233 MG/DL — HIGH (ref 70–99)
HCT VFR BLD CALC: 44.3 % — SIGNIFICANT CHANGE UP (ref 39–50)
HGB BLD-MCNC: 14.3 G/DL — SIGNIFICANT CHANGE UP (ref 13–17)
INR BLD: 0.88 RATIO — SIGNIFICANT CHANGE UP (ref 0.88–1.16)
MAGNESIUM SERPL-MCNC: 2.4 MG/DL — SIGNIFICANT CHANGE UP (ref 1.6–2.6)
MCHC RBC-ENTMCNC: 30.6 PG — SIGNIFICANT CHANGE UP (ref 27–34)
MCHC RBC-ENTMCNC: 32.3 GM/DL — SIGNIFICANT CHANGE UP (ref 32–36)
MCV RBC AUTO: 94.7 FL — SIGNIFICANT CHANGE UP (ref 80–100)
NRBC # BLD: 0 /100 WBCS — SIGNIFICANT CHANGE UP (ref 0–0)
PLATELET # BLD AUTO: 214 K/UL — SIGNIFICANT CHANGE UP (ref 150–400)
POTASSIUM SERPL-MCNC: 4.2 MMOL/L — SIGNIFICANT CHANGE UP (ref 3.5–5.3)
POTASSIUM SERPL-SCNC: 4.2 MMOL/L — SIGNIFICANT CHANGE UP (ref 3.5–5.3)
PROT SERPL-MCNC: 6.7 G/DL — SIGNIFICANT CHANGE UP (ref 6–8.3)
PROTHROM AB SERPL-ACNC: 10 SEC — SIGNIFICANT CHANGE UP (ref 10–12.9)
RBC # BLD: 4.68 M/UL — SIGNIFICANT CHANGE UP (ref 4.2–5.8)
RBC # FLD: 12 % — SIGNIFICANT CHANGE UP (ref 10.3–14.5)
SODIUM SERPL-SCNC: 137 MMOL/L — SIGNIFICANT CHANGE UP (ref 135–145)
TROPONIN T, HIGH SENSITIVITY RESULT: 11 NG/L — SIGNIFICANT CHANGE UP (ref 0–51)
TROPONIN T, HIGH SENSITIVITY RESULT: 13 NG/L — SIGNIFICANT CHANGE UP (ref 0–51)
WBC # BLD: 10.47 K/UL — SIGNIFICANT CHANGE UP (ref 3.8–10.5)
WBC # FLD AUTO: 10.47 K/UL — SIGNIFICANT CHANGE UP (ref 3.8–10.5)

## 2020-01-21 PROCEDURE — 93010 ELECTROCARDIOGRAM REPORT: CPT

## 2020-01-21 PROCEDURE — 85027 COMPLETE CBC AUTOMATED: CPT

## 2020-01-21 PROCEDURE — 93005 ELECTROCARDIOGRAM TRACING: CPT

## 2020-01-21 PROCEDURE — 85730 THROMBOPLASTIN TIME PARTIAL: CPT

## 2020-01-21 PROCEDURE — 99284 EMERGENCY DEPT VISIT MOD MDM: CPT | Mod: 25

## 2020-01-21 PROCEDURE — 83735 ASSAY OF MAGNESIUM: CPT

## 2020-01-21 PROCEDURE — 71046 X-RAY EXAM CHEST 2 VIEWS: CPT

## 2020-01-21 PROCEDURE — 99284 EMERGENCY DEPT VISIT MOD MDM: CPT

## 2020-01-21 PROCEDURE — 71046 X-RAY EXAM CHEST 2 VIEWS: CPT | Mod: 26

## 2020-01-21 PROCEDURE — 85610 PROTHROMBIN TIME: CPT

## 2020-01-21 PROCEDURE — 84484 ASSAY OF TROPONIN QUANT: CPT

## 2020-01-21 PROCEDURE — 80053 COMPREHEN METABOLIC PANEL: CPT

## 2020-01-21 NOTE — ED PROVIDER NOTE - OBJECTIVE STATEMENT
60 yo male with PMHx of HTN, HLD, CAD s/p stent p/w near syncope.  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  Thia afternoon around 12pm he was cooking when he felt lightheaded, became diaphoretic and felt his heart racing.  Patient never completely lost consciousness.  Sat down and slowly started feeling better.  Upon arrival to the ER, all symptom had resolved.  Patient denies fevers/chills, CP, SOB, abd pain, nausea/vomiting.

## 2020-01-21 NOTE — CHART NOTE - NSCHARTNOTEFT_GEN_A_CORE
Briefly 59 M w/ CAD w/ PCI of LAD in 2017 and HTN present for eval of pre-syncope. Pt works as a  and reports that the heat in his kitchen was higher than usual and he felt very hot the entire day and felt dizzy for a few seconds. Denies any LOC or head trauma. Symptoms resolved within a few seconds and pt otherwise denies any chest pain, sob, or palpitations. EKG unchanged from prior and troponins negative x 2. Pt w/ likely vagal response. Advised pt to follow-up with cardiologist who he has appointment with next week. Also advised to return to ER immediately should symptoms recur.    Anthony Chappell PGY4  [phone]  All Cardiology service information can be found 24/7 on [email or web address], password: [email or web address]

## 2020-01-21 NOTE — ED PROVIDER NOTE - CARE PLAN
Principal Discharge DX:	Palpitation  Secondary Diagnosis:	Essential hypertension  Secondary Diagnosis:	MI, old

## 2020-01-21 NOTE — ED ADULT NURSE NOTE - NSSEPSISNEWALTERMENTAL_ED_A_ED
I reviewed the H&P, I examined the patient, and there are no changes in the patient's condition.  Visit Vitals  /67   Pulse 63   Temp 97.6 °F (36.4 °C) (Temporal Artery)   Resp 14   Ht 5' 1\" (1.549 m)   Wt 110.4 kg   SpO2 96%   BMI 45.99 kg/m²     See my note my risks, benefits and alternatives.    No

## 2020-01-21 NOTE — ED PROVIDER NOTE - CARE PROVIDER_API CALL
Sidney Ellison (MD)  Cardiovascular Disease; Internal Medicine; Interventional Cardiology  [id number] [address] [place] [address], [facility] [address] [place] [address]  Phone: [phone]  Fax: [phone]  Follow Up Time:

## 2020-01-21 NOTE — ED PROVIDER NOTE - PATIENT PORTAL LINK FT
You can access the FollowMyHealth Patient Portal offered by [facility] by registering at the following website: [email or web address]. By joining [facility]’s FollowMyHealth portal, you will also be able to view your health information using other applications (apps) compatible with our system.

## 2020-01-21 NOTE — ED PROVIDER NOTE - NSFOLLOWUPINSTRUCTIONS_ED_ALL_ED_FT
Follow up with your doctor in 2 days  Continue your usual medicines as advised  STOP smoking   Return to ED for any difficulty breathing chest pain or syncope palpitation

## 2020-01-21 NOTE — ED PROVIDER NOTE - ATTENDING CONTRIBUTION TO CARE
I have seen and evaluated this patient with the advance practice clinician.   I agree with the findings  unless other wise stated. I have amended notes where needed.  After my face to face bedside evaluation, I am noting:

## 2020-01-21 NOTE — ED ADULT NURSE NOTE - OBJECTIVE STATEMENT
Patient   is  alert  and  oriented  x  3.  Color  is  good  and  skin warm to touch.  He is  c/o  palpitations.  He denies  chest   pain  or  SOB.  Heart  rate  is  WNL.

## 2020-01-21 NOTE — ED PROVIDER NOTE - CLINICAL SUMMARY MEDICAL DECISION MAKING FREE TEXT BOX
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 h/o CAD s/p stents on meds compliant. smoker currently everyday Alert overweight no distress Heart regular s1s2 No murmur or gallop Clear lungs abdomen soft Non focal neuro exam Likely geat related EKG wnl  will get labs trops cardiac monitor re eval --Felton

## 2020-01-30 ENCOUNTER — RX RENEWAL (OUTPATIENT)
Age: 60
End: 2020-01-30

## 2020-02-03 ENCOUNTER — APPOINTMENT (OUTPATIENT)
Dept: GASTROENTEROLOGY | Facility: CLINIC | Age: 60
End: 2020-02-03
Payer: COMMERCIAL

## 2020-02-03 VITALS
HEART RATE: 88 BPM | OXYGEN SATURATION: 95 % | HEIGHT: 65 IN | TEMPERATURE: 98.5 F | BODY MASS INDEX: 34.16 KG/M2 | DIASTOLIC BLOOD PRESSURE: 80 MMHG | SYSTOLIC BLOOD PRESSURE: 135 MMHG | WEIGHT: 205 LBS

## 2020-02-03 DIAGNOSIS — K57.30 DIVERTICULOSIS OF LARGE INTESTINE W/OUT PERFORATION OR ABSCESS W/OUT BLEEDING: ICD-10-CM

## 2020-02-03 PROCEDURE — 99213 OFFICE O/P EST LOW 20 MIN: CPT

## 2020-02-03 NOTE — REVIEW OF SYSTEMS
[Chest Pain] : chest pain [SOB on Exertion] : shortness of breath during exertion [Negative] : Endocrine

## 2020-02-03 NOTE — HISTORY OF PRESENT ILLNESS
[FreeTextEntry1] : Patient without any gastrointestinal complaints. His bowel movements are regular. He does not see any blood in the stool. He had a colonoscopy in 3/2019 that revealed pancolonic diverticulosis and internal hemorrhoids. \par Patient also has no upper gastrointestinal symptoms such as heartburn, regurgitation, dysphagia, or early satiety.\par

## 2020-02-03 NOTE — PHYSICAL EXAM
[General Appearance - Alert] : alert [General Appearance - In No Acute Distress] : in no acute distress [PERRL With Normal Accommodation] : pupils were equal in size, round, and reactive to light [Sclera] : the sclera and conjunctiva were normal [Extraocular Movements] : extraocular movements were intact [Outer Ear] : the ears and nose were normal in appearance [Oropharynx] : the oropharynx was normal [Neck Appearance] : the appearance of the neck was normal [Jugular Venous Distention Increased] : there was no jugular-venous distention [Neck Cervical Mass (___cm)] : no neck mass was observed [Thyroid Diffuse Enlargement] : the thyroid was not enlarged [Thyroid Nodule] : there were no palpable thyroid nodules [Auscultation Breath Sounds / Voice Sounds] : lungs were clear to auscultation bilaterally [Heart Rate And Rhythm] : heart rate was normal and rhythm regular [Heart Sounds] : normal S1 and S2 [Heart Sounds Gallop] : no gallops [Murmurs] : no murmurs [Heart Sounds Pericardial Friction Rub] : no pericardial rub [Bowel Sounds] : normal bowel sounds [Abdomen Soft] : soft [Abdomen Tenderness] : non-tender [] : no hepato-splenomegaly [Abdomen Mass (___ Cm)] : no abdominal mass palpated [No CVA Tenderness] : no ~M costovertebral angle tenderness [Abnormal Walk] : normal gait [No Spinal Tenderness] : no spinal tenderness [Musculoskeletal - Swelling] : no joint swelling seen [Nail Clubbing] : no clubbing  or cyanosis of the fingernails [Oriented To Time, Place, And Person] : oriented to person, place, and time [Motor Tone] : muscle strength and tone were normal [Impaired Insight] : insight and judgment were intact [Affect] : the affect was normal

## 2020-02-05 ENCOUNTER — NON-APPOINTMENT (OUTPATIENT)
Age: 60
End: 2020-02-05

## 2020-02-05 ENCOUNTER — APPOINTMENT (OUTPATIENT)
Dept: CARDIOLOGY | Facility: CLINIC | Age: 60
End: 2020-02-05
Payer: COMMERCIAL

## 2020-02-05 VITALS
WEIGHT: 205 LBS | BODY MASS INDEX: 34.16 KG/M2 | HEART RATE: 72 BPM | HEIGHT: 65 IN | OXYGEN SATURATION: 96 % | RESPIRATION RATE: 14 BRPM | DIASTOLIC BLOOD PRESSURE: 89 MMHG | SYSTOLIC BLOOD PRESSURE: 145 MMHG

## 2020-02-05 DIAGNOSIS — Z87.898 PERSONAL HISTORY OF OTHER SPECIFIED CONDITIONS: ICD-10-CM

## 2020-02-05 DIAGNOSIS — L72.3 SEBACEOUS CYST: ICD-10-CM

## 2020-02-05 PROCEDURE — 93000 ELECTROCARDIOGRAM COMPLETE: CPT

## 2020-02-05 PROCEDURE — 99214 OFFICE O/P EST MOD 30 MIN: CPT

## 2020-02-09 PROBLEM — Z87.898 HISTORY OF CHEST PAIN: Status: RESOLVED | Noted: 2017-12-21 | Resolved: 2020-02-09

## 2020-02-09 RX ORDER — ASPIRIN 81 MG/1
81 TABLET, COATED ORAL DAILY
Qty: 90 | Refills: 3 | Status: DISCONTINUED | COMMUNITY
Start: 2017-10-30 | End: 2020-02-09

## 2020-02-09 NOTE — DISCUSSION/SUMMARY
[Cardiomyopathy] : cardiomyopathy [Hypertension] : hypertension [Coronary Artery Disease] : coronary artery disease [Stable] : stable [FreeTextEntry1] : \par Currently stable from a cardiovascular standpoint. Hypertensive today. History of ischemic cardiomyopathy (LVEF 67% on stress test in 2017). Currently appears euvolemic. Stable CAD. Occasional brief episodes of palpitations. Consider ectopic beats. Continue current medications. ECG completed today and reviewed. Will schedule an echocardiogram to assess his cardiac structures and function. Will consider event monitor. Follow up in 3 months.

## 2020-02-09 NOTE — HISTORY OF PRESENT ILLNESS
[FreeTextEntry1] : Occasional rapid heart beat which occurs maybe twice a month. Episodes are very brief. Denies chest pain, shortness of breath or palpitations. Denies dizziness or lightheadedness.

## 2020-02-09 NOTE — PHYSICAL EXAM
[Normal Appearance] : normal appearance [General Appearance - Well Developed] : well developed [Well Groomed] : well groomed [General Appearance - Well Nourished] : well nourished [No Deformities] : no deformities [General Appearance - In No Acute Distress] : no acute distress [Eyelids - No Xanthelasma] : the eyelids demonstrated no xanthelasmas [Normal Conjunctiva] : the conjunctiva exhibited no abnormalities [Normal Oral Mucosa] : normal oral mucosa [No Oral Pallor] : no oral pallor [No Oral Cyanosis] : no oral cyanosis [FreeTextEntry1] : no carotid bruits or JVD [Respiration, Rhythm And Depth] : normal respiratory rhythm and effort [Exaggerated Use Of Accessory Muscles For Inspiration] : no accessory muscle use [Heart Rate And Rhythm] : heart rate and rhythm were normal [Auscultation Breath Sounds / Voice Sounds] : lungs were clear to auscultation bilaterally [Heart Sounds] : normal S1 and S2 [Edema] : no peripheral edema present [Murmurs] : no murmurs present [Abdomen Tenderness] : non-tender [Abdomen Soft] : soft [Abnormal Walk] : normal gait [Cyanosis, Localized] : no localized cyanosis [Gait - Sufficient For Exercise Testing] : the gait was sufficient for exercise testing [Skin Color & Pigmentation] : normal skin color and pigmentation [No Venous Stasis] : no venous stasis [] : no rash [Oriented To Time, Place, And Person] : oriented to person, place, and time [Affect] : the affect was normal [Mood] : the mood was normal [No Anxiety] : not feeling anxious

## 2020-02-09 NOTE — REVIEW OF SYSTEMS
[see HPI] : see HPI [Shortness Of Breath] : no shortness of breath [Dyspnea on exertion] : not dyspnea during exertion [Leg Claudication] : no intermittent leg claudication [Lower Ext Edema] : no extremity edema [Chest Pain] : no chest pain [Palpitations] : palpitations [Negative] : Psychiatric

## 2020-04-17 NOTE — PATIENT PROFILE ADULT. - AS SC BRADEN MOISTURE
Patient is requesting a refill of doxycycline 50 mg capsules.  Directions are to take one capsule by mouth twice daily - but she only take once daily (see provider note).  Patients acne is well controlled with medication and tolerates it well.  Patient was to have a visit 4/6/20 but appointment was cancelled due to covid virus.  Patient is now rescheduled for 6/1/20.  Please advise if ok to refill.     Last prescription:  4/1/19, 60 caps with 11 refills  Last ov:  6/21/19    ASSESSMENT AND PLAN from 4/1/19  1.  Acne vulgaris. We discussed the risks and benefits of Doxycycline; handout was given and discussed.  We also discussed that she should discontinue Doxycycline if she becomes pregnant.  I prescribed Doxycycline 50 mg, as one capsule by mouth twice daily (I recommend patient actually take one capsule by mouth daily), 60 capsules with 11 refills given.   (4) rarely moist

## 2020-09-09 ENCOUNTER — NON-APPOINTMENT (OUTPATIENT)
Age: 60
End: 2020-09-09

## 2020-09-09 ENCOUNTER — APPOINTMENT (OUTPATIENT)
Dept: CARDIOLOGY | Facility: CLINIC | Age: 60
End: 2020-09-09
Payer: COMMERCIAL

## 2020-09-09 VITALS
BODY MASS INDEX: 32.32 KG/M2 | HEIGHT: 65 IN | WEIGHT: 194 LBS | HEART RATE: 88 BPM | SYSTOLIC BLOOD PRESSURE: 149 MMHG | TEMPERATURE: 96.7 F | DIASTOLIC BLOOD PRESSURE: 86 MMHG | OXYGEN SATURATION: 94 %

## 2020-09-09 DIAGNOSIS — E11.9 TYPE 2 DIABETES MELLITUS W/OUT COMPLICATIONS: ICD-10-CM

## 2020-09-09 PROCEDURE — 99214 OFFICE O/P EST MOD 30 MIN: CPT

## 2020-09-09 PROCEDURE — 93000 ELECTROCARDIOGRAM COMPLETE: CPT

## 2020-09-09 NOTE — HISTORY OF PRESENT ILLNESS
[FreeTextEntry1] : Doing okay. Complains of palpitations almost daily for past week or so. Denies chest pain or shortness of breath. Denies dizziness or lightheadedness. Has been diagnosed with diabetes.

## 2020-09-09 NOTE — DISCUSSION/SUMMARY
[Coronary Artery Disease] : coronary artery disease [Chronic Systolic Heart Failure] : chronic systolic congestive heart failure [Compensated] : compensated [Hypertension] : hypertension [Stable] : stable [FreeTextEntry1] : \par Currently stable from a cardiovascular standpoint. Hypertensive today. Chronic systolic heart failure secondary to ischemic cardiomyopathy. Currently appears euvolemic. Stable CAD. no ischemic or CHF symptoms. Palpitations likely not cardiac in origin as patient had symptoms during physical exam today which revealed regular heart rate and rhythm. Will continue to monitor. Continue current medications. ECG completed today and reviewed. Advised patient to maintain adequate hydration. Will schedule an echocardiogram to assess his cardiac structures and function (did not have it done after his last visit). Pending the test results, I will make further recommendations. Follow up in 4 months.

## 2020-09-09 NOTE — PHYSICAL EXAM
[General Appearance - Well Developed] : well developed [Normal Appearance] : normal appearance [General Appearance - Well Nourished] : well nourished [Well Groomed] : well groomed [General Appearance - In No Acute Distress] : no acute distress [Normal Conjunctiva] : the conjunctiva exhibited no abnormalities [No Deformities] : no deformities [Eyelids - No Xanthelasma] : the eyelids demonstrated no xanthelasmas [Normal Oral Mucosa] : normal oral mucosa [No Oral Cyanosis] : no oral cyanosis [No Oral Pallor] : no oral pallor [FreeTextEntry1] : no carotid bruits or JVD [Respiration, Rhythm And Depth] : normal respiratory rhythm and effort [Exaggerated Use Of Accessory Muscles For Inspiration] : no accessory muscle use [Auscultation Breath Sounds / Voice Sounds] : lungs were clear to auscultation bilaterally [Murmurs] : no murmurs present [Heart Sounds] : normal S1 and S2 [Heart Rate And Rhythm] : heart rate and rhythm were normal [Abdomen Tenderness] : non-tender [Edema] : no peripheral edema present [Abdomen Soft] : soft [Gait - Sufficient For Exercise Testing] : the gait was sufficient for exercise testing [Abnormal Walk] : normal gait [Cyanosis, Localized] : no localized cyanosis [Skin Color & Pigmentation] : normal skin color and pigmentation [] : no rash [No Venous Stasis] : no venous stasis [Oriented To Time, Place, And Person] : oriented to person, place, and time [Affect] : the affect was normal [Mood] : the mood was normal [No Anxiety] : not feeling anxious

## 2020-09-09 NOTE — REVIEW OF SYSTEMS
[see HPI] : see HPI [Shortness Of Breath] : no shortness of breath [Chest Pain] : no chest pain [Dyspnea on exertion] : not dyspnea during exertion [Lower Ext Edema] : no extremity edema [Anxiety] : anxiety [Palpitations] : palpitations [Leg Claudication] : no intermittent leg claudication [Under Stress] : under stress [Negative] : Integumentary

## 2020-09-27 NOTE — ED PROCEDURE NOTE - CPROC ED TIME OUT STATEMENT1
“Patient's name, , procedure and correct site were confirmed during the [facility] Timeout.” no vomiting

## 2020-10-05 ENCOUNTER — APPOINTMENT (OUTPATIENT)
Dept: CV DIAGNOSITCS | Facility: HOSPITAL | Age: 60
End: 2020-10-05
Payer: COMMERCIAL

## 2020-10-05 ENCOUNTER — OUTPATIENT (OUTPATIENT)
Dept: OUTPATIENT SERVICES | Facility: HOSPITAL | Age: 60
LOS: 1 days | End: 2020-10-05

## 2020-10-05 DIAGNOSIS — I25.10 ATHEROSCLEROTIC HEART DISEASE OF NATIVE CORONARY ARTERY WITHOUT ANGINA PECTORIS: ICD-10-CM

## 2020-10-05 DIAGNOSIS — Z98.890 OTHER SPECIFIED POSTPROCEDURAL STATES: Chronic | ICD-10-CM

## 2020-10-05 PROCEDURE — 93306 TTE W/DOPPLER COMPLETE: CPT | Mod: 26

## 2020-12-21 PROBLEM — Z12.11 ENCOUNTER FOR SCREENING COLONOSCOPY: Status: RESOLVED | Noted: 2019-02-04 | Resolved: 2020-12-21

## 2021-04-10 NOTE — ED ADULT NURSE NOTE - OBJECTIVE STATEMENT
11-Apr-2021 00:21
Facilitator RN Note:  Received pt into spot #12 via wheelchair. Pt A/O x 4 calm cooperative, c/o atraumatic right knee pain since Wednesday. Pt also c/o right calf pain and posterior right thigh pain. Pt states he got out of bed Wednesday morning and while walking pain came on. Denies any falls or injuries. Denies any fever/chills, denies any arthritis. Denies any long car rides, or sitting long periods of time. Right knee appears more larger than left. Warm to touch but no redness noted. Bilateral pedal pulses strong and equal. Denies any numbness or tingling. Pt is on Brilinta for coronary stent. Report given to primary RN Rhiannon.

## 2021-04-29 NOTE — PATIENT PROFILE ADULT - NSSCCAGEALCOHOLGUILTYABOUT_GEN_A_NUR
[address] PRIMARY CARE  [address] [facility] [address]  [address] [address]  Dept: [phone]  Dept Fax: [phone]    Yumi Richmond is a 70 y.o. female who presents today for her medical conditions/complaints as noted below. Yumi Richmond is c/o of  Chief Complaint   Patient presents with    Diabetes     a1c    Incisional Pain     pt had a pain pump inserted beginning of the month.  Encopresis       HPI:     HPI      Pt here for follow up on diabetes    A1c today is 7.1. Improved from last visit. She is on lantus and has sliding scale as well. Had pain pump placed   in [address] [place] [address]. Has been helping her back pain. she does feel incision site is still mildly sore . Denies any drainage. She also notes for past six months on occasion will have stool come out involuntarily. Denies any constipation or diarrhea. She states happens few times a month, other times does not. Denies any blood in her stool or abdominal pain. Had colonoscopy . HTN is well controlled.      Hemoglobin A1C (%)   Date Value   2021 7.1   2021 8.6   2020 10.2 (H)             ( goal A1C is < 7)   No results found for: LABMICR  LDL Cholesterol (mg/dL)   Date Value   2020 44   2015 86   10/06/2014 86     LDL Calculated (mg/dL)   Date Value   2019 47       (goal LDL is <100)   AST (U/L)   Date Value   2020 20     ALT (U/L)   Date Value   2020 29     BUN (mg/dL)   Date Value   2020 18     BP Readings from Last 3 Encounters:   21 127/82   21 (!) 154/74   21 128/82          (goal 120/80)    Past Medical History:   Diagnosis Date    Arthritis     Bowel obstruction ([address]     Cancer ([facility]     ovarian stage 2    Cerebral artery occlusion with cerebral infarction ([facility] 2018    Mini strokes \"Tia's\"    Diabetes mellitus ([facility]     Epigastric pain     GERD (gastroesophageal reflux disease)     History of anesthesia complications 7864'G    was told after gallbladder surgery to never have anesthesia again \"since it was hard for me to come out of it\"    History of blood transfusion     Hx of blood clots     lung     Hypertension     Right arm pain     Right shoulder pain     Sleep apnea     uses CPAP nightly    Tachycardia     hx of    Thyroid disease     TIA (transient ischemic attack)       Past Surgical History:   Procedure Laterality Date    ANESTHESIA NERVE BLOCK Left 11/7/2019    NERVE BLOCK (DEFINE) - INTERCOSTAL NERVE BLOCK performed by Ethel Peters MD at [facility] Bilateral 5/15/2020    NERVE BLOCK BILATERAL - MBB  L4-5, L5-S1 performed by Ethel Peters MD at [address] Left     ORIF    CHOLECYSTECTOMY      COLONOSCOPY  07/08/2019    5 yr recall.     COLONOSCOPY N/A 7/8/2019    COLONOSCOPY WITH BIOPSY performed by Alex Boone MD at [address] [facility]. INJECTION PROCEDURE FOR SACROILIAC JOINT Bilateral 8/29/2019    SACROILIAC JOINT INJECTION performed by Ethel Peters MD at [address] Bilateral 10/10/2019    SACROILIAC JOINT INJECTION performed by Ethel Peters MD at [facility]  2/2014    [facility] with bso    JOINT REPLACEMENT Right     shoulder    KNEE ARTHROSCOPY Right     NERVE BLOCK Bilateral 08/29/2019    SI joint injection    NERVE BLOCK Bilateral 10/10/2019    SI joint    NERVE BLOCK Bilateral 05/15/2020    Medial branch block L4-5, L5-S1    NERVE BLOCK  06/05/2020    SPINAL CORD STIMULATOR IMPLANT TRIAL (N/A )    OTHER SURGICAL HISTORY Right 11/11/2014    shoulder manipulation    SHOULDER ARTHROSCOPY Right 02/03/15    SHOULDER SURGERY  11/2014    manipulation    SPINAL CORD STIMULATOR SURGERY N/A 6/5/2020    SPINAL CORD STIMULATOR IMPLANT TRIAL performed by Ethel Peters, TABLET BY MOUTH DAILY 30 tablet 10    oxyCODONE-acetaminophen (PERCOCET) 5-325 MG per tablet Take 1 tablet by mouth every 4 hours as needed for Pain.  tamsulosin (FLOMAX) 0.4 MG capsule TAKE 1 CAPSULE BY MOUTH NIGHTLY *EMERGENCY REFILL* 30 capsule 10    blood glucose test strips (ACCU-CHEK TED PLUS) strip USE TO TEST BLOOD SUGAR THREE TIMES A DAY AND AS NEEDED FOR SYMPTOMS OF IRREGULAR BLOOD GLUCOSE 100 strip 10    SURE COMFORT INSULIN SYRINGE 31G X 5/16\" 1 ML MISC USE AS DIRECTED FOUR TIMES A  each 10    alendronate (FOSAMAX) 70 MG tablet TAKE 1 TABLET BY MOUTH EVERY 7 DAYS 12 tablet 10    XARELTO 20 MG TABS tablet TAKE 1 TABLET BY MOUTH EVERY DAY 90 tablet 10    furosemide (LASIX) 20 MG tablet Take 1 tablet by mouth daily 30 tablet 0    vitamin C (ASCORBIC ACID) 500 MG tablet Take 500 mg by mouth daily      glucose monitoring kit (FREESTYLE) monitoring kit 1 kit by Does not apply route daily Please provide accuchek meter for patient, not freestyle 1 kit 0    EPINEPHrine (EPIPEN 2-LISA) 0.3 MG/0.3ML SOAJ injection EpiPen 2-Lisa 0.3 mg/0.3 mL injection, auto-injector      calcium carbonate (TUMS) 500 MG chewable tablet Take 1 tablet by mouth daily as needed for Heartburn      SUPER B COMPLEX/C CAPS Take by mouth      Cholecalciferol (VITAMIN D3) 5000 units TABS Take by mouth      ferrous sulfate 325 (65 Fe) MG tablet Take 325 mg by mouth daily (with breakfast)      fexofenadine (ALLEGRA) 60 MG tablet Take 60 mg by mouth daily      magnesium oxide (MAG-OX) 400 MG tablet Take 400 mg by mouth daily      Melatonin 10 MG TABS Take 10 mg by mouth daily as needed       gabapentin (NEURONTIN) 600 MG tablet TAKE 1 TABLET BY MOUTH FOUR TIMES DAILY 120 tablet 10     No current facility-administered medications for this visit.       Allergies   Allergen Reactions    Aspirin Anaphylaxis     Only thing she can take is tylenol    Benadryl [Diphenhydramine] Other (See Comments)     Dystonic movement equal, round, and reactive to light. Neck:      Musculoskeletal: Neck supple. Cardiovascular:      Rate and Rhythm: Normal rate and regular rhythm. Heart sounds: Normal heart sounds. Pulmonary:      Effort: Pulmonary effort is normal.      Breath sounds: Normal breath sounds. Skin:     General: Skin is warm and dry. Comments: Incision site on abdomen healing well, no drainage noted. Incision site on back also healing, no drainage noted   Neurological:      Mental Status: She is alert and oriented to person, place, and time. Psychiatric:         Mood and Affect: Mood normal.         Behavior: Behavior normal.         Thought Content: Thought content normal.       /82   Pulse 68   Resp 20   Ht 5' 4\" (1.626 m)   Wt 228 lb (103.4 kg)   SpO2 98%   BMI 39.14 kg/m²     Assessment:      1. Type 2 diabetes mellitus with diabetic neuropathy, with long-term current use of insulin ([address]- A1c improved to 7. 1. continue current medication and healthy diet. Stay active. 2. Chronic back pain, unspecified back location, unspecified back pain laterality  - had pain pump placed in 4/9 at [place], has been helping her pain. Incision site on abdomen is a little tender, but no drainage noted. States it has been improving. Discussed if notes more erythema she can let me know . 3. Essential hypertension  - BP controlled, continue current medications. 4. Incontinence of feces, unspecified fecal incontinence type  - recommend following up with GI, last seen in 2019. Information provided for her to call. Plan:      Return in about 3 months (around 7/29/2021) for diabetes follow up. Orders Placed This Encounter   Procedures    POCT glycosylated hemoglobin (Hb A1C)     No orders of the defined types were placed in this encounter. Patient given educational materials - see patient instructions. Discussed use, benefit, and side effects of prescribed medications.   All patient questions answered. Pt voiced understanding. Reviewed healthmaintenance. Instructed to continue current medications, diet and exercise. Patient agreed with treatment plan. Follow up as directed.      Electronically signed by Kasandra Johnson DO on 4/29/2021 at 1:26 PM no

## 2021-05-17 NOTE — ED ADULT NURSE NOTE - NSFALLRSKPASTHIST_ED_ALL_ED
05/17/21 1000   General Information   Type of Visit Initial OP Ortho PT Evaluation   Start of Care Date 05/17/21   Referring Physician TAYE Edwards   Patient/Family Goals Statement see if PT can help decrease pain   Orders Evaluate and Treat   Date of Order 05/06/21   Certification Required? Yes   Medical Diagnosis  Lumbar radiculopathy   Surgical/Medical history reviewed Yes   Precautions/Limitations no known precautions/limitations   Body Part(s)   Body Part(s) Lumbar Spine/SI   Presentation and Etiology   Pertinent history of current problem (include personal factors and/or comorbidities that impact the POC) Pt has a hx of LBP off and on and was doing pretty good until 15 months ago when she stepped over her dog and had severe LBP and 1 week later sx into L LE. She was told she could have an injection or sugery but chose to try the injeciton a couple of weeks ago that helped LBP but L LE sx seem to get worse. Pt had MRI lumbar spine about a year ago that showed an pinched nerve in her low back. She has a hx of neck surgery. She takes a muscle relaxer and IBP. She has been using ice at times and also has been using a back support brace. Pt did have PT for 1 visit March of 2020 but may have stopped because of Covid. Pt now walking with a limp.    Impairments A. Pain;B. Decreased WB tolerance;D. Decreased ROM;H. Impaired gait;J. Burning;K. Numbness;L. Tingling;G. Impaired balance   Functional Limitations perform activities of daily living;perform required work activities;perform desired leisure / sports activities   Symptom Location L>R low back and buttocks, Left lateral lower extremity into L foot into last 3 toes and bottom of L foot, tingling and pain. L LE feels like it could give way   How/Where did it occur At home   Onset date of current episode/exacerbation 02/27/20   Chronicity Chronic   Pain rating (0-10 point scale) Best (/10);Worst (/10)   Best (/10) average LBP and L LE pain 6/1-   Worst (/10)  8/10   Pain quality B. Dull;A. Sharp;C. Aching;F. Stabbing;E. Shooting;D. Burning   Frequency of pain/symptoms A. Constant   Pain/symptoms are: Other   Pain symptoms comment worst at the end of day especially after work   Pain/symptoms exacerbated by A. Sitting;I. Bending;J. ADL;K. Home tasks;L. Work tasks;G. Certain positions;M. Other   Pain exacerbation comment bending, work   Pain/symptoms eased by A. Sitting;C. Rest;H. Cold;I. OTC medication(s);J. Braces/supports   Progression of symptoms since onset: Worsened   Prior Level of Function   Prior Level of Function-Mobility ind   Prior Level of Function-ADLs ind   Current Level of Function   Patient role/employment history A. 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 No   Have you fallen and had an injury in the past year? No   Is patient a fall risk? No   Abuse Screen (yes response referral indicated)   Feels Unsafe at Home or Work/School no   Feels Threatened by Someone no   Does Anyone Try to Keep You From Having Contact with Others or Doing Things Outside Your Home? no   Physical Signs of Abuse Present no   System Outcome Measures   Outcome Measures Low Back Pain (see Oswestry and Gerardo)   Functional Scales   Functional Scales Other   Other Scales  medium Gerardo, 31.11% BELÉN   Lumbar Spine/SI Objective Findings   Observation Pain behaviors demonstrated, chose to stand during part of exam   Posture leans to R in sitting, guarded posture sitting and standing, standing pt had decreased L LE wt bearing, L knee flexed   Gait/Locomotion slow gait, limp present, no assistive device, leans to R   Flexion ROM 75% decrease with pain   Extension ROM 25% decrease noted she felt better   Lumbar/SI Special Tests Comments did mechanical lumbar exam for directional preference using static/dynamic force analysis testing. In standing prior to testing LBP 5/10,  L lower extremity sx into L foot 6/10. Flexion in standing x 1 rep increased LBP during, no change in L LE, worse. Extension in standing x 1 decreased LBP during, no change in L LE sx. Hooklying decreased LBP to 3/10, L LE sx to L foot 6/10. Flexion in lying decreased tingling to 2/10 duirng, 6/10 after, no change. Prone lying abolished low back sx, decreased L LE sx to L foot 3/10, better. Prone on elbows decreased L foot 2/10, better. Partial prone extension produced low back stretch during, L foot 2/10 after, no change.   Planned Therapy Interventions   Planned Therapy Interventions ROM;strengthening;stretching;neuromuscular re-education   Planned Therapy Interventions Comment manual therapy if needed, back care education   Planned Modality Interventions   Planned Modality Interventions Electrical stimulation;Ultrasound;Traction   Planned Modality Interventions Comments if needed   Clinical Impression   Criteria for Skilled Therapeutic Interventions Met yes, treatment indicated   PT Diagnosis mechanical low back pain with left lower extremity pain/referral, lumbar radiculopathy   Influenced by the following impairments pain, ROM, weakness,   Functional limitations due to impairments bending, adl's, prolonged sitting, standing, walking, work   Clinical Presentation Evolving/Changing   Clinical Presentation Rationale pt noting worsening of sx, chronic, hx neck surgery   Clinical Decision Making (Complexity) Moderate complexity   Therapy Frequency 1 time/week   Predicted Duration of Therapy Intervention (days/wks) 12 weeks, decrease as able   Risk & Benefits of therapy have been explained Yes   Patient, Family & other staff in agreement with plan of care Yes   Education Assessment   Preferred Learning Style Listening   Barriers to Learning No barriers   ORTHO GOALS   PT Ortho Eval Goals 1;2   Ortho Goal 1   Goal Identifier Pain   Goal Description Pt will note a decrease in average LBP and L LE pain from a 6/10 to a  3-4/10 or less so pt can meet goal of sx reduction with work and adl's   Target Date 06/17/21   Ortho Goal 2   Goal Identifier Function   Goal Description pt will note a decrease in pain and carry over to improved functional level as measured by BELÉN score decrease from 31/11% to 23% or less   Target Date 06/28/21   Total Evaluation Time   PT Eval, Moderate Complexity Minutes (69076) 25   Therapy Certification   Certification date from 05/17/21   Certification date to 08/15/21   Medical Diagnosis  Lumbar radiculopathy      no

## 2021-05-19 ENCOUNTER — APPOINTMENT (OUTPATIENT)
Dept: DISASTER EMERGENCY | Facility: OTHER | Age: 61
End: 2021-05-19

## 2021-07-07 ENCOUNTER — NON-APPOINTMENT (OUTPATIENT)
Age: 61
End: 2021-07-07

## 2021-07-07 ENCOUNTER — APPOINTMENT (OUTPATIENT)
Dept: CARDIOLOGY | Facility: CLINIC | Age: 61
End: 2021-07-07
Payer: COMMERCIAL

## 2021-07-07 VITALS
OXYGEN SATURATION: 97 % | DIASTOLIC BLOOD PRESSURE: 89 MMHG | HEART RATE: 86 BPM | BODY MASS INDEX: 33.28 KG/M2 | SYSTOLIC BLOOD PRESSURE: 143 MMHG | HEIGHT: 65 IN

## 2021-07-07 VITALS — WEIGHT: 200 LBS | BODY MASS INDEX: 33.28 KG/M2

## 2021-07-07 PROCEDURE — 99072 ADDL SUPL MATRL&STAF TM PHE: CPT

## 2021-07-07 PROCEDURE — 99214 OFFICE O/P EST MOD 30 MIN: CPT

## 2021-07-07 PROCEDURE — 93000 ELECTROCARDIOGRAM COMPLETE: CPT

## 2021-07-08 NOTE — DISCUSSION/SUMMARY
[Cardiomyopathy] : cardiomyopathy [Coronary Artery Disease] : coronary artery disease [Stable] : stable [Hypertension] : hypertension [Medication Changes Per Orders] : Medication changes are as documented in orders [FreeTextEntry1] : \par Currently stable from a cardiovascular standpoint. Hypertensive. History of ischemic cardiomyopathy. Currently euvolemic. Stable CAD (s/p prox and mid LAD stents). No ischemic or CHF symptoms. Will increase lisinopril to 10 mg daily for treatment of BP. Continue all other current medications. ECG completed today and reviewed (findings as noted above). Follow up in 3 months

## 2021-07-08 NOTE — CARDIOLOGY SUMMARY
[de-identified] : \par 07/07/21 - normal sinus rhythm, normal ECG\par  [de-identified] : \par 03/14/19 (exercise ECG) - 4 METS, 81% MPHR, 04:19 min, excessive increase in HR, no ECG abnormalities, no CP, Silvestre score 4\par 06/02/17 (regadenoson myoview) - no clear evidence of ischemia, septal wall infarct, LVEF 35%\par  [de-identified] : \par 10/05/20 - normal LA, mild segmental LV systolic dysfunction, concentric LV remodeling, normal RV size and function, LVEF 48%\par  [de-identified] : \par 12/22/17 (PCI) - PROMUS PREMIER stent to mLAD 60%\par 12/22/17 (Diag) - pLAD 20% ISR, mLAD 20% ISR, mLAD 60% (iFR 0.86), mCx 40%, pOM1 30%, mRCA 20%, dRCA 20%, LVEF 50%\par 10/09/16 (PCI) - SYNERGY stents to pLAD 70% and mLAD 99%

## 2021-07-08 NOTE — HISTORY OF PRESENT ILLNESS
[FreeTextEntry1] : Doing okay. States that when he takes Imdur it goes right through him. Denies chest pain, shortness of breath or palpitations.

## 2021-07-12 RX ORDER — ISOSORBIDE MONONITRATE 30 MG/1
30 TABLET, EXTENDED RELEASE ORAL DAILY
Qty: 90 | Refills: 3 | Status: DISCONTINUED | COMMUNITY
Start: 2018-11-08 | End: 2021-07-12

## 2021-08-10 ENCOUNTER — EMERGENCY (EMERGENCY)
Facility: HOSPITAL | Age: 61
LOS: 1 days | Discharge: HOME | End: 2021-08-10
Attending: STUDENT IN AN ORGANIZED HEALTH CARE EDUCATION/TRAINING PROGRAM | Admitting: STUDENT IN AN ORGANIZED HEALTH CARE EDUCATION/TRAINING PROGRAM
Payer: COMMERCIAL

## 2021-08-10 VITALS
SYSTOLIC BLOOD PRESSURE: 165 MMHG | HEART RATE: 94 BPM | DIASTOLIC BLOOD PRESSURE: 100 MMHG | RESPIRATION RATE: 18 BRPM | OXYGEN SATURATION: 97 % | TEMPERATURE: 99 F | HEIGHT: 69 IN

## 2021-08-10 VITALS
SYSTOLIC BLOOD PRESSURE: 145 MMHG | RESPIRATION RATE: 16 BRPM | HEART RATE: 69 BPM | DIASTOLIC BLOOD PRESSURE: 89 MMHG | OXYGEN SATURATION: 99 % | TEMPERATURE: 98 F

## 2021-08-10 DIAGNOSIS — Z98.890 OTHER SPECIFIED POSTPROCEDURAL STATES: Chronic | ICD-10-CM

## 2021-08-10 PROCEDURE — 99284 EMERGENCY DEPT VISIT MOD MDM: CPT

## 2021-08-10 PROCEDURE — 71101 X-RAY EXAM UNILAT RIBS/CHEST: CPT | Mod: 26,LT

## 2021-08-10 PROCEDURE — 71250 CT THORAX DX C-: CPT | Mod: 26

## 2021-08-10 RX ORDER — OXYCODONE HYDROCHLORIDE 5 MG/1
1 TABLET ORAL
Qty: 15 | Refills: 0
Start: 2021-08-10 | End: 2021-08-14

## 2021-08-10 RX ORDER — IBUPROFEN 200 MG
1 TABLET ORAL
Qty: 21 | Refills: 0
Start: 2021-08-10 | End: 2021-08-16

## 2021-08-10 RX ORDER — LIDOCAINE 4 G/100G
1 CREAM TOPICAL ONCE
Refills: 0 | Status: DISCONTINUED | OUTPATIENT
Start: 2021-08-10 | End: 2021-08-10

## 2021-08-10 RX ORDER — KETOROLAC TROMETHAMINE 30 MG/ML
30 SYRINGE (ML) INJECTION ONCE
Refills: 0 | Status: DISCONTINUED | OUTPATIENT
Start: 2021-08-10 | End: 2021-08-10

## 2021-08-10 RX ORDER — IBUPROFEN 200 MG
600 TABLET ORAL ONCE
Refills: 0 | Status: COMPLETED | OUTPATIENT
Start: 2021-08-10 | End: 2021-08-10

## 2021-08-10 RX ORDER — OXYCODONE AND ACETAMINOPHEN 5; 325 MG/1; MG/1
1 TABLET ORAL ONCE
Refills: 0 | Status: DISCONTINUED | OUTPATIENT
Start: 2021-08-10 | End: 2021-08-10

## 2021-08-10 RX ORDER — LIDOCAINE 4 G/100G
1 CREAM TOPICAL ONCE
Refills: 0 | Status: COMPLETED | OUTPATIENT
Start: 2021-08-10 | End: 2021-08-10

## 2021-08-10 RX ADMIN — LIDOCAINE 1 PATCH: 4 CREAM TOPICAL at 19:10

## 2021-08-10 RX ADMIN — OXYCODONE AND ACETAMINOPHEN 1 TABLET(S): 5; 325 TABLET ORAL at 21:12

## 2021-08-10 RX ADMIN — Medication 600 MILLIGRAM(S): at 19:10

## 2021-08-10 NOTE — ED PROVIDER NOTE - PROGRESS NOTE DETAILS
Pt with 2 rib fractures 6th and 7th rib. Also chronic rib fractures. Pain medications sent to pharmacy. Incentive spirometer given. VSS.

## 2021-08-10 NOTE — ED PROVIDER NOTE - ATTENDING CONTRIBUTION TO CARE
60 yo male on aspirin presents to ED for evaluation of left sided rib pain s/p fall. PE as above. A/P imaging, medicate, reassess

## 2021-08-10 NOTE — ED PROVIDER NOTE - CLINICAL SUMMARY MEDICAL DECISION MAKING FREE TEXT BOX
60 yo man presenting s/p fall. Concern for rib fractures vs contusion. With SOB concern for pneumothorax. Will give pain meds and get CXR, CT to to evaluate.

## 2021-08-10 NOTE — ED PROVIDER NOTE - NSFOLLOWUPINSTRUCTIONS_ED_ALL_ED_FT
You have a fracture of 2 of your ribs on the left side. Your 6th and 7th ribs are fractured.   Pain medication was sent to your pharmacy     You are to use the incentive spirometer 2-3 times every 1-2 hours     Fracture    A fracture is a break in one of your bones. This can occur from a variety of injuries, especially traumatic ones. Symptoms include pain, bruising, or swelling. Do not use the injured limb. If a fracture is in one of the bones below your waist, do not put weight on that limb unless instructed to do so by your healthcare provider. Crutches or a cane may have been provided. A splint or cast may have been applied by your health care provider. Make sure to keep it dry and follow up with an orthopedist as instructed.    SEEK IMMEDIATE MEDICAL CARE IF YOU HAVE ANY OF THE FOLLOWING SYMPTOMS: numbness, tingling, increasing pain, or weakness in any part of the injured limb.

## 2021-08-10 NOTE — ED ADULT TRIAGE NOTE - MODE OF ARRIVAL
----- Message from Cathryn Heck sent at 1/22/2019 12:09 PM CST -----  Contact: Self  Patient is returning Albert's call, call back at [phone].  Thanks  
Please contact pt as he wishes to speak with you. According to his last email he was feeling better and no further treatment was needed. Please review and advise.  
Walk in

## 2021-08-10 NOTE — ED ADULT NURSE NOTE - OBJECTIVE STATEMENT
Pt fell off a chair--pt c/o pain to lt chest/back--pt seen by md and given lidocaine patch and motrn

## 2021-08-10 NOTE — ED ADULT TRIAGE NOTE - CHIEF COMPLAINT QUOTE
Pt states he was getting up from a chair on saturday night and fell backwards back into the chair. Denies feeling weak or dizzy. C/o pain to back on the left side. Pt states he was waiting to come in to see if the pain would improve, but it didn't. Also c/o SOB, worse with movement.

## 2021-08-10 NOTE — ED PROVIDER NOTE - OBJECTIVE STATEMENT
pt is 62 yo M presenting for fall on Saturday. pt reports he fell off of his chair and hit his left side on the metal chair. He felt immediate pain on his left side. He did not lose consciousness or hit his head. Since this incident the pain has been constant and worsening. The pain does no radiate. The pain is worse with movement and taking deep breaths. He tried Tylenol with no relief The patient also reports associated SOB. He denies HA CP N/V. He takes ASA and no blood thinners.

## 2021-08-10 NOTE — ED PROVIDER NOTE - NS ED ROS FT
GENERAL: No fever, no chills  EYES: no change in vision  HEENT: no trouble swallowing, no trouble speaking  CARDIAC: no chest pain, no palpitations  PULMONARY: no cough, SOB (+)  GI: no abdominal pain, no nausea, no vomiting, no diarrhea, no constipation  : no dysuria, no frequency, no change in appearance, no odor of urine  SKIN: no rashes, bruising on ribs L flank (+)  NEURO: no headache, no weakness  MSK: no joint pain (+) Rib pain

## 2021-08-10 NOTE — ED PROVIDER NOTE - PHYSICAL EXAMINATION
Gen: NAD, non-toxic appearing  Head: normal appearing  HEENT: normal conjunctiva, oral mucosa moist  Lung: no respiratory distress, speaking in full sentences, CTA b/l     CV: regular rate and rhythm, no murmurs  Abd: soft, non distended, non tender   MSK: no visible deformities. 2x2 bruise on Left flank upper ribs bruise TTP, no palpable step offs or obvious deformities of the ribs.     Neuro: No focal deficits, AAOx3  Skin: Warm  Psych: normal affect

## 2021-08-10 NOTE — ED PROVIDER NOTE - PATIENT PORTAL LINK FT
You can access the FollowMyHealth Patient Portal offered by [facility] by registering at the following website: [email or web address]. By joining [employer]’s FollowMyHealth portal, you will also be able to view your health information using other applications (apps) compatible with our system.

## 2021-08-13 ENCOUNTER — EMERGENCY (EMERGENCY)
Facility: HOSPITAL | Age: 61
LOS: 1 days | Discharge: HOME | End: 2021-08-13
Attending: EMERGENCY MEDICINE | Admitting: EMERGENCY MEDICINE
Payer: COMMERCIAL

## 2021-08-13 VITALS
RESPIRATION RATE: 18 BRPM | TEMPERATURE: 97 F | OXYGEN SATURATION: 96 % | HEIGHT: 69 IN | SYSTOLIC BLOOD PRESSURE: 173 MMHG | HEART RATE: 82 BPM | DIASTOLIC BLOOD PRESSURE: 86 MMHG

## 2021-08-13 DIAGNOSIS — Z98.890 OTHER SPECIFIED POSTPROCEDURAL STATES: Chronic | ICD-10-CM

## 2021-08-13 PROCEDURE — 99283 EMERGENCY DEPT VISIT LOW MDM: CPT

## 2021-08-13 RX ORDER — LIDOCAINE 4 G/100G
1 CREAM TOPICAL ONCE
Refills: 0 | Status: DISCONTINUED | OUTPATIENT
Start: 2021-08-13 | End: 2021-08-13

## 2021-08-13 RX ORDER — OXYCODONE HYDROCHLORIDE 5 MG/1
1 TABLET ORAL
Qty: 20 | Refills: 0
Start: 2021-08-13 | End: 2021-08-17

## 2021-08-13 RX ORDER — LIDOCAINE 4 G/100G
1 CREAM TOPICAL ONCE
Refills: 0 | Status: COMPLETED | OUTPATIENT
Start: 2021-08-13 | End: 2021-08-13

## 2021-08-13 RX ORDER — DIAZEPAM 5 MG
5 TABLET ORAL ONCE
Refills: 0 | Status: DISCONTINUED | OUTPATIENT
Start: 2021-08-13 | End: 2021-08-13

## 2021-08-13 RX ORDER — OXYCODONE HYDROCHLORIDE 5 MG/1
5 TABLET ORAL ONCE
Refills: 0 | Status: DISCONTINUED | OUTPATIENT
Start: 2021-08-13 | End: 2021-08-13

## 2021-08-13 RX ORDER — DIAZEPAM 5 MG
1 TABLET ORAL
Qty: 15 | Refills: 0
Start: 2021-08-13 | End: 2021-08-17

## 2021-08-13 RX ADMIN — OXYCODONE HYDROCHLORIDE 5 MILLIGRAM(S): 5 TABLET ORAL at 22:28

## 2021-08-13 RX ADMIN — Medication 5 MILLIGRAM(S): at 22:28

## 2021-08-13 RX ADMIN — LIDOCAINE 1 PATCH: 4 CREAM TOPICAL at 22:30

## 2021-08-13 NOTE — ED PROVIDER NOTE - PATIENT PORTAL LINK FT
You can access the FollowMyHealth Patient Portal offered by Health system by registering at the following website: [email or web address]. By joining [employer]’s FollowMyHealth portal, you will also be able to view your health information using other applications (apps) compatible with our system.

## 2021-08-13 NOTE — ED PROVIDER NOTE - ATTENDING CONTRIBUTION TO CARE
I have seen and examined the patient on the patient´s visit date. I have reviewed the note written by Jose Holder MD on that visit day. I have supervised and participated as necessary in the performance of procedures indicated for patient management and was available at all phases of the patient´s visit when needed. We discussed the history, physical exam findings, management plan, and  medical decision making. I have made my additions, exceptions, and revisions within the chart and I agree with H and P as documented in its entirety. The data and my interpretation of any data collected from labs, interventions and imaging appear below as well as my independent medical decision making and considerations    The patient is a 61y Male who has a past medical and surgery history of HTN IschCMP/MI/stents Hyperlipidemia PTED with persistent pain after being evaluated for rib fractures on 8/10 pt at that time has a CTScan of the chest that showed acute nondisplaced lateral left sixth and seventh rib fractures.   Vital Signs Last 24 Hrs  T(F): 96.7 HR: 82 P: 173/86 RR: 18 SpO2: 96% (13 Aug 2021 20:52)   PE: as described; my additions and exceptions are noted in the chart    DATA:    IMPRESSION/RISK:  Dx=rib pain from fractures    Consideration include No stigmata of complications such as atelectasis or pneumonia  Plan  analgesics  reassess  dispo as per results and response

## 2021-08-13 NOTE — ED PROVIDER NOTE - PHYSICAL EXAMINATION
Gen: WDWN, NAD  HEENT: EOMI, no nasal discharge, mucous membranes moist  CV: RRR, +S1/S2, no M/R/G  Resp: CTAB, no W/R/R  GI: Abdomen soft non-distended, NTTP, no masses  MSK: No open wounds, no bruising, no LE edema + L sided chest wall ttp  Neuro: A&Ox4, following commands, moving all four extremities spontaneously  Psych: appropriate mood, affect

## 2021-08-13 NOTE — ED PROVIDER NOTE - CLINICAL SUMMARY MEDICAL DECISION MAKING FREE TEXT BOX
61M w/ known rib fracture presenting w/ poor home pain regimen - will plan to discuss proper regimen, dc.

## 2021-08-13 NOTE — ED ADULT TRIAGE NOTE - CHIEF COMPLAINT QUOTE
2 rib fractures on L side here for worsening pain, here 3 days ago D/C with pain medications - no dif breathing or hemoptysis

## 2021-08-13 NOTE — ED ADULT NURSE NOTE - OBJECTIVE STATEMENT
Pt arrives to [facility] c/o left sided rib/back pain related to a prior injury that he was seen and treated in the hospital for.  Pt has 2 rib fractures on L side and states pain is "too much."  No difficulty breathing.  Pt assessed by MD.  Pt medicated as per EMAR.

## 2021-08-13 NOTE — ED PROVIDER NOTE - OBJECTIVE STATEMENT
61M known  2 L sided rib fx s/p recent fall presenting w/ continued pain. Has been taking oxy 5 q8h & ibuprofen. States pain severe, pleuritic. Unable to tolerate pain. States otherwise well, respiratory distress.

## 2021-08-13 NOTE — ED PROVIDER NOTE - NSFOLLOWUPINSTRUCTIONS_ED_ALL_ED_FT
The patient received the following instructions below in Slovenian     Rib Fracture    WHAT YOU NEED TO KNOW:    A rib fracture is a crack or break in a rib bone. Rib fractures usually heal within 6 weeks. You should be able to return to your usual activities before that time.  Rib Cage    DISCHARGE INSTRUCTIONS:    Call your local emergency number (911 in the [place]) if:    You have trouble breathing.    You have new or increased pain.  Seek care immediately if:    Your pain does not get better, even after treatment.    You have a fever or a cough.  Call your doctor if:    You have questions or concerns about your condition or care.    Medicines: You may need any of the following:    NSAIDs, such as ibuprofen, help decrease swelling, pain, and fever. This medicine is available with or without a doctor's order. NSAIDs can cause stomach bleeding or kidney problems in certain people. If you take blood thinner medicine, always ask your healthcare provider if NSAIDs are safe for you. Always read the medicine label and follow directions.    Prescription pain medicine may be given. Ask your healthcare provider how to take this medicine safely. Some prescription pain medicines contain acetaminophen. Do not take other medicines that contain acetaminophen without talking to your healthcare provider. Too much acetaminophen may cause liver damage. Prescription pain medicine may cause constipation. Ask your healthcare provider how to prevent or treat constipation.    Take your medicine as directed. Contact your healthcare provider if you think your medicine is not helping or if you have side effects. Tell him or her if you are allergic to any medicine. Keep a list of the medicines, vitamins, and herbs you take. Include the amounts, and when and why you take them. Bring the list or the pill bottles to follow-up visits. Carry your medicine list with you in case of an emergency.  Self-care:    Take deep breaths and cough 10 times each hour. This will decrease your risk for a lung infection. Hug a pillow on your injured side to decrease pain while you take deep breaths. Take a deep breath and hold it for as long as you can. Let the air out and then cough. Deep breaths help open your airway. You may be given an incentive spirometer to help you take deep breaths. Put the plastic piece in your mouth and take a slow, deep breath, then let the air out and cough. Repeat these steps 10 times every hour.    Rest and limit activity as directed. Do not pull, push, or lift objects. Start to do more as your pain decreases. Ask your healthcare provider how much activity you can do.    Apply ice on your chest near your fractured rib for 15 to 20 minutes every hour or as directed. Use an ice pack, or put crushed ice in a plastic bag. Cover it with a towel. Ice helps prevent tissue damage and decreases swelling and pain.  Follow up with your doctor as directed: Write down your questions so you remember to ask them during your visits.

## 2021-09-08 ENCOUNTER — APPOINTMENT (OUTPATIENT)
Dept: UROLOGY | Facility: CLINIC | Age: 61
End: 2021-09-08
Payer: COMMERCIAL

## 2021-09-08 VITALS
HEART RATE: 72 BPM | RESPIRATION RATE: 17 BRPM | HEIGHT: 65 IN | TEMPERATURE: 98 F | BODY MASS INDEX: 33.32 KG/M2 | WEIGHT: 200 LBS | DIASTOLIC BLOOD PRESSURE: 92 MMHG | SYSTOLIC BLOOD PRESSURE: 174 MMHG

## 2021-09-08 DIAGNOSIS — N40.0 BENIGN PROSTATIC HYPERPLASIA WITHOUT LOWER URINARY TRACT SYMPMS: ICD-10-CM

## 2021-09-08 PROCEDURE — 51741 ELECTRO-UROFLOWMETRY FIRST: CPT

## 2021-09-08 PROCEDURE — 99204 OFFICE O/P NEW MOD 45 MIN: CPT

## 2021-09-08 PROCEDURE — 51798 US URINE CAPACITY MEASURE: CPT

## 2021-09-08 NOTE — REVIEW OF SYSTEMS
[Feeling Tired] : feeling tired [Sore Throat] : sore throat [Cough] : cough [Diarrhea] : diarrhea [see HPI] : see HPI [No erections] : no erections [Poor quality erections] : Poor quality erections [Strong urge to urinate] : strong urge to urinate [Slow urine stream] : slow urine stream [Leakage of urine with urgency] : leakage of urine with urgency [Joint Pain] : joint pain [Joint Swelling] : joint swelling [Limb Weakness] : limb weakness [Limb Swelling] : limb swelling [Difficulty Walking] : difficulty walking [Depression] : depression [Negative] : Heme/Lymph

## 2021-09-09 NOTE — PHYSICAL EXAM
[Urethral Meatus] : meatus normal [Urinary Bladder Findings] : the bladder was normal on palpation [Scrotum] : the scrotum was normal [Testes Mass (___cm)] : there were no testicular masses [No Prostate Nodules] : no prostate nodules

## 2021-09-10 LAB
ANION GAP SERPL CALC-SCNC: 14 MMOL/L
BILIRUB UR QL STRIP: NEGATIVE
BUN SERPL-MCNC: 12 MG/DL
CALCIUM SERPL-MCNC: 9.9 MG/DL
CHLORIDE SERPL-SCNC: 98 MMOL/L
CLARITY UR: CLEAR
CO2 SERPL-SCNC: 25 MMOL/L
COLLECTION METHOD: NORMAL
CREAT SERPL-MCNC: 0.75 MG/DL
GLUCOSE SERPL-MCNC: 218 MG/DL
GLUCOSE UR-MCNC: NORMAL
HCG UR QL: 0.2 EU/DL
HGB UR QL STRIP.AUTO: NEGATIVE
KETONES UR-MCNC: NEGATIVE
LEUKOCYTE ESTERASE UR QL STRIP: NEGATIVE
NITRITE UR QL STRIP: NEGATIVE
PH UR STRIP: 5.5
POTASSIUM SERPL-SCNC: 4.7 MMOL/L
PROT UR STRIP-MCNC: NORMAL
PSA SERPL-MCNC: 1.36 NG/ML
SODIUM SERPL-SCNC: 137 MMOL/L
SP GR UR STRIP: 1.03

## 2021-10-11 ENCOUNTER — APPOINTMENT (OUTPATIENT)
Dept: UROLOGY | Facility: CLINIC | Age: 61
End: 2021-10-11
Payer: COMMERCIAL

## 2021-10-11 ENCOUNTER — OUTPATIENT (OUTPATIENT)
Dept: OUTPATIENT SERVICES | Facility: HOSPITAL | Age: 61
LOS: 1 days | End: 2021-10-11
Payer: COMMERCIAL

## 2021-10-11 DIAGNOSIS — R35.0 FREQUENCY OF MICTURITION: ICD-10-CM

## 2021-10-11 DIAGNOSIS — Z98.890 OTHER SPECIFIED POSTPROCEDURAL STATES: Chronic | ICD-10-CM

## 2021-10-11 PROCEDURE — 51741 ELECTRO-UROFLOWMETRY FIRST: CPT

## 2021-10-11 PROCEDURE — 52000 CYSTOURETHROSCOPY: CPT

## 2021-10-11 PROCEDURE — 51798 US URINE CAPACITY MEASURE: CPT

## 2021-10-11 NOTE — HISTORY OF PRESENT ILLNESS
[FreeTextEntry1] : s is a 61 year-year-old [place] [facility] retired gentleman with gross hematuria and lower urinary tract symptoms urgency frequency and nocturia.  He has an extensive history of tobacco use.  He is a daily smoker.  He reports only intermittent hematuria.  No history of bladder cancer patient is referred for evaluation of his condition. . Patient denies any dysuria or urinary incontinence. The patient denies any aggravating or relieving factors. The patient denies any interference of function. The patient is entirely asymptomatic. All other review of systems are negative. Past medical history, family history and social history were inquired and were noncontributory to current condition. Patient reports daily tobacco use]. Medications and allergies were reviewed.\par \par Patient presents today for cystourethroscopy.\par \par After informed consent was obtained he was taken to the cystoscopy suite where he was informed consent was obtained and present prepped and draped in usual sterile fashion 10 cc of lidocaine jelly instilled through the urethra a flexible cystoscope was advanced through the urethra into the bladder.  Patient has a normal urethra without strictures he has trilobar hypertrophy large obstructing lateral lobes and a large median lobe with 2+ trabeculations of the bladder.  His bladder mucosa is completely normal.  There is clear reflux from the left side and the right side of the bladder ureteral orifice ease.\par \par Is bladder was filled to capacity and a flow study obtained.\par \par The maximum flow was 15 mL/s the average flow was 7 mL/s he voided 297 and he had a postvoid residual of 3.  He has a normal voiding curve with slight strain at the end.\par \par Impression cystoscopy shows no evidence of bladder cancer and a heavy smoker.  CT scan of the abdomen and pelvis is still pending.  Patient be started on finasteride for his enlarged prostate and lower urinary tract symptoms.

## 2021-10-11 NOTE — HISTORY OF PRESENT ILLNESS
[FreeTextEntry1] : Reason for Visit: Gross hematuria and LUTS \par \par This is a 61 year-year-old [place]  retired gentleman with gross hematuria and lower urinary tract symptoms urgency frequency and nocturia.  He has an extensive history of tobacco use.  He is a daily smoker.  He reports only intermittent hematuria.  No history of bladder cancer patient is referred for evaluation of his condition. . Patient denies any dysuria or urinary incontinence. The patient denies any aggravating or relieving factors. The patient denies any interference of function. The patient is entirely asymptomatic. All other review of systems are negative. Past medical history, family history and social history were inquired and were noncontributory to current condition. Patient [denies tobacco use]. Medications and allergies were reviewed.\par \par On examination, the patient is a healthy-appearing gentleman in no acute distress. He is alert and oriented follows commands. He has normal mood and affect. He is normocephalic. Oral no thrush. Neck is supple. Respirations are unlabored. His abdomen is soft and nontender. Liver is nonpalpable. Bladder is nonpalpable. No CVA tenderness. Neurologically he is grossly intact. No peripheral edema. Skin without gross abnormality. He has normal male external genitalia. Normal meatus. Bilateral testes are descended intrascrotally and normal to palpation. On rectal examination, there is normal sphincter tone. The prostate is clinically benign without focal induration or nodularity.\par \par He was able to produce a flow study for us.  He he urinated approximately 75 cc so limited volume but with that he had a maximum flow of 9 cc/s average flow 4 cc/s.  His postvoid residual was 20.  He has nocturia x2 his AUA symptom score is 8 out of 35.\par Assessment: Gross hematuria intermittent, daily smoking, moderate lower urinary tract symptoms\par \par I counseled the patient. I discussed the various etiologies of his symptoms. []. Risks and alternatives were discussed. I answered the patient questions. The patient will follow-up as directed and will contact me with any questions or concerns. Thank you for the opportunity to participate in the care of this patient. I'll keep you updated on his progress.\par \par Plan: Obtain a PSA blood test today, cystourethroscopy to evaluate his lower tracts.  Also CT scan of the abdomen and pelvis with a CT urogram.\par

## 2021-10-12 ENCOUNTER — OUTPATIENT (OUTPATIENT)
Dept: OUTPATIENT SERVICES | Facility: HOSPITAL | Age: 61
LOS: 1 days | End: 2021-10-12
Payer: COMMERCIAL

## 2021-10-12 ENCOUNTER — APPOINTMENT (OUTPATIENT)
Dept: CT IMAGING | Facility: IMAGING CENTER | Age: 61
End: 2021-10-12
Payer: COMMERCIAL

## 2021-10-12 DIAGNOSIS — Z00.8 ENCOUNTER FOR OTHER GENERAL EXAMINATION: ICD-10-CM

## 2021-10-12 DIAGNOSIS — R31.9 HEMATURIA, UNSPECIFIED: ICD-10-CM

## 2021-10-12 DIAGNOSIS — Z98.890 OTHER SPECIFIED POSTPROCEDURAL STATES: Chronic | ICD-10-CM

## 2021-10-12 PROCEDURE — 74178 CT ABD&PLV WO CNTR FLWD CNTR: CPT

## 2021-10-12 PROCEDURE — 74178 CT ABD&PLV WO CNTR FLWD CNTR: CPT | Mod: 26

## 2021-10-13 ENCOUNTER — APPOINTMENT (OUTPATIENT)
Dept: CARDIOLOGY | Facility: CLINIC | Age: 61
End: 2021-10-13
Payer: COMMERCIAL

## 2021-10-13 ENCOUNTER — NON-APPOINTMENT (OUTPATIENT)
Age: 61
End: 2021-10-13

## 2021-10-13 VITALS
DIASTOLIC BLOOD PRESSURE: 88 MMHG | WEIGHT: 200 LBS | HEART RATE: 81 BPM | BODY MASS INDEX: 33.32 KG/M2 | OXYGEN SATURATION: 95 % | HEIGHT: 65 IN | SYSTOLIC BLOOD PRESSURE: 133 MMHG

## 2021-10-13 PROCEDURE — 99214 OFFICE O/P EST MOD 30 MIN: CPT

## 2021-10-13 PROCEDURE — 93000 ELECTROCARDIOGRAM COMPLETE: CPT

## 2021-10-17 NOTE — DISCUSSION/SUMMARY
[Cardiomyopathy] : cardiomyopathy [Coronary Artery Disease] : coronary artery disease [Hypertension] : hypertension [Stable] : stable [FreeTextEntry1] : \par Currently stable from a cardiovascular standpoint. Normotensive. History of ischemic cardiomyopathy (LVEF 48%). Currently euvolemic. Stable CAD (s/p prox and mid LAD stents). No ischemic or CHF symptoms. Unclear etiology of chronic diarrhea. Consider medication related. May reduce atorvastatin to 40 mg daily and monitor symptoms. Patient to speak with PCP in regards to metformin as cause of diarrhea. Continue all other current medications. ECG completed today and reviewed (findings as noted above). Follow up in 4-6 months.

## 2021-10-17 NOTE — REVIEW OF SYSTEMS
[Abdominal Pain] : no abdominal pain [Nausea] : no nausea [Vomiting] : no vomiting [Heartburn] : no heartburn [Change in Appetite] : no change in appetite [Dysphagia] : no dysphagia [Diarrhea] : diarrhea [Constipation] : no constipation [Blood in stool] : no blood in stoo [Negative] : Musculoskeletal

## 2021-10-17 NOTE — HISTORY OF PRESENT ILLNESS
[FreeTextEntry1] : Doing okay. Has been having diarrhea almost daily for past year. Denies chest pain, shortness of breath or palpitations.

## 2021-10-17 NOTE — CARDIOLOGY SUMMARY
[de-identified] : \par 10/13/21 - normal sinus rhythm, nonspecific ST abnormality\par  [de-identified] : \par 03/14/19 (exercise ECG) - 4 METS, 81% MPHR, 04:19 min, excessive increase in HR, no ECG abnormalities, no CP, Silvestre score 4\par 06/02/17 (regadenoson myoview) - no clear evidence of ischemia, septal wall infarct, LVEF 35%\par  [de-identified] : \par 10/05/20 - normal LA, mild segmental LV systolic dysfunction, concentric LV remodeling, normal RV size and function, LVEF 48%\par  [de-identified] : \par 12/22/17 (PCI) - PROMUS PREMIER stent to mLAD 60%\par 12/22/17 (DIAG) - pLAD 20% ISR, mLAD 20% ISR, mLAD 60% (iFR 0.86), mCx 40%, pOM1 30%, mRCA 20%, dRCA 20%, LVEF 50%\par 10/09/16 (PCI) - SYNERGY stents to pLAD 70% and mLAD 99%

## 2021-10-18 NOTE — PATIENT PROFILE ADULT - NSPROMUTANXFEARADDRESSFT_GEN_A_NUR
Abram Kiran is a 75year n/a

## 2021-10-23 ENCOUNTER — EMERGENCY (EMERGENCY)
Facility: HOSPITAL | Age: 61
LOS: 1 days | Discharge: HOME | End: 2021-10-23
Attending: STUDENT IN AN ORGANIZED HEALTH CARE EDUCATION/TRAINING PROGRAM | Admitting: STUDENT IN AN ORGANIZED HEALTH CARE EDUCATION/TRAINING PROGRAM
Payer: COMMERCIAL

## 2021-10-23 VITALS
HEART RATE: 95 BPM | HEIGHT: 69 IN | OXYGEN SATURATION: 97 % | DIASTOLIC BLOOD PRESSURE: 97 MMHG | SYSTOLIC BLOOD PRESSURE: 173 MMHG | RESPIRATION RATE: 16 BRPM | TEMPERATURE: 98 F

## 2021-10-23 VITALS
OXYGEN SATURATION: 96 % | TEMPERATURE: 98 F | HEART RATE: 66 BPM | DIASTOLIC BLOOD PRESSURE: 86 MMHG | RESPIRATION RATE: 17 BRPM | SYSTOLIC BLOOD PRESSURE: 147 MMHG

## 2021-10-23 DIAGNOSIS — Z98.890 OTHER SPECIFIED POSTPROCEDURAL STATES: Chronic | ICD-10-CM

## 2021-10-23 LAB
ALBUMIN SERPL ELPH-MCNC: 4.7 G/DL — SIGNIFICANT CHANGE UP (ref 3.3–5)
ALP SERPL-CCNC: 79 U/L — SIGNIFICANT CHANGE UP (ref 40–120)
ALT FLD-CCNC: 15 U/L — SIGNIFICANT CHANGE UP (ref 4–41)
ANION GAP SERPL CALC-SCNC: 14 MMOL/L — SIGNIFICANT CHANGE UP (ref 7–14)
AST SERPL-CCNC: 12 U/L — SIGNIFICANT CHANGE UP (ref 4–40)
BASOPHILS # BLD AUTO: 0.07 K/UL — SIGNIFICANT CHANGE UP (ref 0–0.2)
BASOPHILS NFR BLD AUTO: 0.6 % — SIGNIFICANT CHANGE UP (ref 0–2)
BILIRUB SERPL-MCNC: 0.4 MG/DL — SIGNIFICANT CHANGE UP (ref 0.2–1.2)
BUN SERPL-MCNC: 8 MG/DL — SIGNIFICANT CHANGE UP (ref 7–23)
CALCIUM SERPL-MCNC: 9.5 MG/DL — SIGNIFICANT CHANGE UP (ref 8.4–10.5)
CHLORIDE SERPL-SCNC: 101 MMOL/L — SIGNIFICANT CHANGE UP (ref 98–107)
CO2 SERPL-SCNC: 23 MMOL/L — SIGNIFICANT CHANGE UP (ref 22–31)
CREAT SERPL-MCNC: 0.67 MG/DL — SIGNIFICANT CHANGE UP (ref 0.5–1.3)
EOSINOPHIL # BLD AUTO: 0.3 K/UL — SIGNIFICANT CHANGE UP (ref 0–0.5)
EOSINOPHIL NFR BLD AUTO: 2.7 % — SIGNIFICANT CHANGE UP (ref 0–6)
ERYTHROCYTE [SEDIMENTATION RATE] IN BLOOD: 13 MM/HR — SIGNIFICANT CHANGE UP (ref 1–15)
GLUCOSE SERPL-MCNC: 182 MG/DL — HIGH (ref 70–99)
HCT VFR BLD CALC: 45.5 % — SIGNIFICANT CHANGE UP (ref 39–50)
HGB BLD-MCNC: 15.3 G/DL — SIGNIFICANT CHANGE UP (ref 13–17)
IANC: 8.42 K/UL — SIGNIFICANT CHANGE UP (ref 1.5–8.5)
IMM GRANULOCYTES NFR BLD AUTO: 0.5 % — SIGNIFICANT CHANGE UP (ref 0–1.5)
LYMPHOCYTES # BLD AUTO: 1.46 K/UL — SIGNIFICANT CHANGE UP (ref 1–3.3)
LYMPHOCYTES # BLD AUTO: 13.4 % — SIGNIFICANT CHANGE UP (ref 13–44)
MCHC RBC-ENTMCNC: 31.5 PG — SIGNIFICANT CHANGE UP (ref 27–34)
MCHC RBC-ENTMCNC: 33.6 GM/DL — SIGNIFICANT CHANGE UP (ref 32–36)
MCV RBC AUTO: 93.8 FL — SIGNIFICANT CHANGE UP (ref 80–100)
MONOCYTES # BLD AUTO: 0.61 K/UL — SIGNIFICANT CHANGE UP (ref 0–0.9)
MONOCYTES NFR BLD AUTO: 5.6 % — SIGNIFICANT CHANGE UP (ref 2–14)
NEUTROPHILS # BLD AUTO: 8.42 K/UL — HIGH (ref 1.8–7.4)
NEUTROPHILS NFR BLD AUTO: 77.2 % — HIGH (ref 43–77)
NRBC # BLD: 0 /100 WBCS — SIGNIFICANT CHANGE UP
NRBC # FLD: 0 K/UL — SIGNIFICANT CHANGE UP
PLATELET # BLD AUTO: 191 K/UL — SIGNIFICANT CHANGE UP (ref 150–400)
POTASSIUM SERPL-MCNC: 4.6 MMOL/L — SIGNIFICANT CHANGE UP (ref 3.5–5.3)
POTASSIUM SERPL-SCNC: 4.6 MMOL/L — SIGNIFICANT CHANGE UP (ref 3.5–5.3)
PROT SERPL-MCNC: 7.2 G/DL — SIGNIFICANT CHANGE UP (ref 6–8.3)
RBC # BLD: 4.85 M/UL — SIGNIFICANT CHANGE UP (ref 4.2–5.8)
RBC # FLD: 12.8 % — SIGNIFICANT CHANGE UP (ref 10.3–14.5)
SODIUM SERPL-SCNC: 138 MMOL/L — SIGNIFICANT CHANGE UP (ref 135–145)
WBC # BLD: 10.92 K/UL — HIGH (ref 3.8–10.5)
WBC # FLD AUTO: 10.92 K/UL — HIGH (ref 3.8–10.5)

## 2021-10-23 PROCEDURE — 73610 X-RAY EXAM OF ANKLE: CPT | Mod: 26,RT

## 2021-10-23 PROCEDURE — 93971 EXTREMITY STUDY: CPT | Mod: 26,LT

## 2021-10-23 PROCEDURE — 99285 EMERGENCY DEPT VISIT HI MDM: CPT

## 2021-10-23 RX ORDER — ACETAMINOPHEN 500 MG
650 TABLET ORAL ONCE
Refills: 0 | Status: COMPLETED | OUTPATIENT
Start: 2021-10-23 | End: 2021-10-23

## 2021-10-23 RX ORDER — APIXABAN 2.5 MG/1
1 TABLET, FILM COATED ORAL
Qty: 60 | Refills: 0
Start: 2021-10-23 | End: 2021-11-21

## 2021-10-23 RX ORDER — RIVAROXABAN 15 MG-20MG
15 KIT ORAL ONCE
Refills: 0 | Status: DISCONTINUED | OUTPATIENT
Start: 2021-10-23 | End: 2021-10-23

## 2021-10-23 RX ORDER — APIXABAN 2.5 MG/1
10 TABLET, FILM COATED ORAL ONCE
Refills: 0 | Status: COMPLETED | OUTPATIENT
Start: 2021-10-23 | End: 2021-10-23

## 2021-10-23 RX ADMIN — Medication 650 MILLIGRAM(S): at 07:59

## 2021-10-23 RX ADMIN — APIXABAN 10 MILLIGRAM(S): 2.5 TABLET, FILM COATED ORAL at 09:18

## 2021-10-23 NOTE — ED POST DISCHARGE NOTE - ADDITIONAL DOCUMENTATION
Received call from pharmacist stating instructions were incorrect for Eliquis starter pack- resent rx for DVT tx- 10mg bid for 7 days then 5mg bid

## 2021-10-23 NOTE — ED PROVIDER NOTE - ATTENDING CONTRIBUTION TO CARE
62 y/o M with PMH CAD s/p stent, HTN, HLD, DM p/w right ankle pain and swelling. pt states he did not hit his leg , states he has had pain in the leg which is located in the calf and ankle. He states pain is worse with movement and with walking. He denies chest pain, SOB, fever, chills. Pulses intact. Swelling located over medial aspect of ankle. pt w/ peripheral neuropathy denies trauma but isnt sure. pain worse with walking   denies fever, chills, chest pain, SOB, abdominal pain, diarrhea, dysuria, syncope, bleeding, new rash,weakness, numbness, blurred vision  + Right ankle  ROS  otherwise negative as per HPI  Gen: Awake, Alert, WD, WN, NAD  Head:  NC/AT  Eyes:  PERRL, EOMI, Conjunctiva pink, lids normal, no scleral icterus  ENT: OP clear, no exudates,  moist mucus membranes  Neck: supple, nontender, no meningismus, no JVD, trachea midline  Cardiac/CV:  S1 S2, RRR, no M/G/R  Respiratory/Pulm:  CTAB, good air movement, normal resp effort, no wheezes/stridor/retractions/rales/rhonchi  Gastrointestinal/Abdomen:  Soft, nontender, nondistended, +BS, no rebound/guarding  Back:  no CVAT, no MLT  Ext:  warm, well perfused, moving all extremities spontaneously, no peripheral edema, distal pulses intact mild tenderness to right medial ankle   Skin: mild edema to right ankle, mild swelling to right ankle   Neuro:  AAOx3, sensation intact, motor 5/5 x 4 extremities, normal gait, speech clear peripheral neuropathy   MDM as above

## 2021-10-23 NOTE — ED PROVIDER NOTE - NSFOLLOWUPINSTRUCTIONS_ED_ALL_ED_FT
Deep Vein Thrombosis    A deep vein thrombosis (DVT) is a blood clot (thrombus) that usually occurs in a deep, larger vein of the lower leg or the pelvis, or in an upper extremity such as the arm. These are dangerous and can lead to serious and even life-threatening complications if the clot travels to the lungs. Symptoms include swelling of the arm or leg, warmth and redness of the arm or leg, and pain. Treatment may include taking a blood thinning medication; make sure to take anything prescribed as instructed.    SEEK IMMEDIATE MEDICAL CARE IF YOU HAVE ANY OF THE FOLLOWING SYMPTOMS: shortness of breath, chest pain, rapid or irregular heartbeat, lightheadedness/dizziness, coughing up blood, or any bleeding in your vomit, stool, or urine. These symptoms may represent a serious problem that is an emergency. Do not wait to see if the symptoms will go away. Call 911 and do not drive yourself to the hospital.     -Please follow up with your Primary Care Doctor within 24-48 hours and bring your paperwork     -Please take medications as prescribed

## 2021-10-23 NOTE — ED PROVIDER NOTE - CLINICAL SUMMARY MEDICAL DECISION MAKING FREE TEXT BOX
62 y/o M with PMH CAD s/p stent, HTN, HLD, DM p/w right ankle pain and swelling. pt states he did not hit his leg , states he has had pain in the leg which is located in the calf and ankle. possible trauma vs less likely infection, cbc, cmp, esr, cxr, us pain meds

## 2021-10-23 NOTE — ED PROVIDER NOTE - PHYSICAL EXAMINATION
PHYSICAL EXAM:  GENERAL: non-toxic appearing; in no respiratory distress  HEAD Atraumatic, Normocephalic  NECK: No JVD; trachea midline  EYES: PERRL, EOMs intact b/l w/out deficits; normal conjunctiva  CHEST/LUNG: CTAB no wheezes/rhonchi/rales  HEART: RRR no murmur/gallops/rubs  ABDOMEN: +BS, soft, NT, ND  EXTREMITIES: No LE edema, +2 radial pulses b/l, +2 DP/PT pulses b/l  MUSCULOSKELETAL: FROM of right ankle; on the medial malleolar region, there is warmth, redness, and swelling; there is no tracking erythema up the leg;   NERVOUS SYSTEM:  A&Ox3, No motor deficits or sensory deficits; CNII-XII intact; no focal neurologic deficits  SKIN:  Warm and dry as visualized

## 2021-10-23 NOTE — ED ADULT TRIAGE NOTE - CHIEF COMPLAINT QUOTE
pt c/o RLE swelling since yesterday morning. leg is swollen, warm to touch, and painful. PMH NIDDM, HLD, HTN.

## 2021-10-23 NOTE — ED PROVIDER NOTE - PATIENT PORTAL LINK FT
You can access the FollowMyHealth Patient Portal offered by [facility] by registering at the following website: [email or web address]. By joining [employer]’s FollowMyHealth portal, you will also be able to view your health information using other applications (apps) compatible with our system.

## 2021-10-23 NOTE — ED PROVIDER NOTE - PROGRESS NOTE DETAILS
Sascha, PGY3: pt has dvt, well appearing, low risk bleeding based on hasbled score. VSS. Time was taken to answer all of patients questions and concerns. Return precaution instructions were given and patient understands and feels comfortable with disposition.

## 2021-10-23 NOTE — ED ADULT NURSE NOTE - OBJECTIVE STATEMENT
pt received to room 7, a&ox 4 and ambulatory. pmh of HTN, HLD, NIDDM. p/w swelling and pain to right medial ankle since yesterday morning. Medial ankle noted swollen and warm to touch. Pedal pulses palpable. Pt denies trauma to right medial ankle or falls. Denies calf pain. Pt breathing even and unlabored on room air.  Denies fever, chills, cough, SOB, chest pain, palpitations, dizziness, N/V/D, numbness, tingling. Ultrasouns and X-ray pending. Stretcher in lowest position, wheels locked, appropriate side rails in place, call bell in reach.

## 2021-10-23 NOTE — ED PROVIDER NOTE - OBJECTIVE STATEMENT
62 yo M PMhx cad s/p stents, htn, hld, dm, presents to the ED c/o right ankle pain and swelling since yesterday. pt states he woke up and stepped down and noticed the pain. he denies fall or trauma. he is also c/o some calf pain. he denies fever, chills, cp, sob, abd pain, dizziness, n/v/d, prior dvt/pe.

## 2021-10-23 NOTE — ED PROVIDER NOTE - NS ED ROS FT
Constitutional: no fevers; no chills  HEENT: no visual changes, no sore throat, no rhinorrhea  CV: no cp; no palpitations  Resp: no sob; no cough  GI: no abd pain, no nausea, no vomiting, no diarrhea, no constipation  : no dysuria, no hematuria  MSK: myalgias; no arthralgias  skin: no rashes  neuro: no HA, no numbness; no weakness, no tingling  ROS statement: all other ROS negative except as per HPI

## 2021-11-01 ENCOUNTER — LABORATORY RESULT (OUTPATIENT)
Age: 61
End: 2021-11-01

## 2021-11-10 ENCOUNTER — TRANSCRIPTION ENCOUNTER (OUTPATIENT)
Age: 61
End: 2021-11-10

## 2021-11-22 RX ORDER — FINASTERIDE 5 MG/1
5 TABLET, FILM COATED ORAL DAILY
Qty: 90 | Refills: 3 | Status: DISCONTINUED | COMMUNITY
Start: 2021-09-08 | End: 2021-11-22

## 2021-11-23 ENCOUNTER — TRANSCRIPTION ENCOUNTER (OUTPATIENT)
Age: 61
End: 2021-11-23

## 2021-12-15 ENCOUNTER — APPOINTMENT (OUTPATIENT)
Dept: CARDIOLOGY | Facility: CLINIC | Age: 61
End: 2021-12-15

## 2022-01-01 NOTE — PATIENT PROFILE ADULT. - FUNCTIONAL SCREEN CURRENT LEVEL: TRANSFERRING, MLM
You can access the FollowMyHealth Patient Portal offered by [facility] by registering at the following website: [email or web address]. By joining [employer]’s FollowMyHealth portal, you will also be able to view your health information using other applications (apps) compatible with our system. (0) independent

## 2022-02-07 ENCOUNTER — RX RENEWAL (OUTPATIENT)
Age: 62
End: 2022-02-07

## 2022-03-09 ENCOUNTER — APPOINTMENT (OUTPATIENT)
Dept: CARDIOLOGY | Facility: CLINIC | Age: 62
End: 2022-03-09

## 2022-04-27 NOTE — ED ADULT TRIAGE NOTE - MEANS OF ARRIVAL
ambulatory Finasteride Counseling:  I discussed with the patient the risks of use of finasteride including but not limited to decreased libido, decreased ejaculate volume, gynecomastia, and depression. Women should not handle medication.  All of the patient's questions and concerns were addressed. Finasteride Male Counseling: Finasteride Counseling:  I discussed with the patient the risks of use of finasteride including but not limited to decreased libido, decreased ejaculate volume, gynecomastia, and depression. Women should not handle medication.  All of the patient's questions and concerns were addressed.

## 2022-06-13 RX ORDER — ASPIRIN ENTERIC COATED TABLETS 81 MG 81 MG/1
81 TABLET, DELAYED RELEASE ORAL DAILY
Qty: 90 | Refills: 3 | Status: ACTIVE | COMMUNITY
Start: 2018-11-05 | End: 1900-01-01

## 2022-09-07 ENCOUNTER — APPOINTMENT (OUTPATIENT)
Dept: CARDIOLOGY | Facility: CLINIC | Age: 62
End: 2022-09-07

## 2022-10-10 ENCOUNTER — APPOINTMENT (OUTPATIENT)
Dept: UROLOGY | Facility: CLINIC | Age: 62
End: 2022-10-10

## 2023-07-12 NOTE — H&P CARDIOLOGY - REVIEW OF SYSTEMS
See HPI Thalidomide Pregnancy And Lactation Text: This medication is Pregnancy Category X and is absolutely contraindicated during pregnancy. It is unknown if it is excreted in breast milk.

## 2023-10-27 NOTE — ED PROVIDER NOTE - GENITOURINARY NEGATIVE STATEMENT, MLM
Chief Complaint   Patient presents with   • Coronary Artery Disease   • Office Visit       PROBLEM LIST  1) CAD  2) PAF  3) Carotid stenosis  4) HTN  5) HPL  6) LVH      CHIEF COMPLAINT: Check up, Transfer of care    HISTORY OF PRESENT ILLNESS:  Adeline Post is a 82 year old female presenting for Transfer of Care from Dr. Wood. Patient has history of CAD, LVH, PAF, CHB s/p BiV pacemaker, TIA in 2016, and carotid stenosis.  She also has a history of Diabetes and CKD. Patient had a cardiac cath 2016 which showed OM1 to be totally occluded, no intervention, patient is medically managed. She had an US 2019 showing LICA >70% stenosis, BECK 50%. This was followed up with CTA neck 2019 with BECK to have mild stenosis, LICA has 60% stenosis. In 2016 patient underwent BIV pacemaker .     Patient denies chest pain. Reports shortness of breath has worsened over the past 3 months.  Can walk only short distances; could not tolerate walking 1 block. Reports palpitations that feel like racing; can occur several times daily. Racing lasts for minutes, and is accompanied by shortness of breath.     I have reviewed the above note and I agree with the findings.    Allergies, Medications, and Social history reviewed.  Denies Latex allergy or sensitivity.   TOB reviewed, update    REVIEW OF SYSTEMS: Eye Problem(s):negative  ENT Problem(s):negative  Cardiovascular problem(s):dyspnea on exertion and palpitations  Respiratory problem(s) dyspnea on exertion  Gastro-intestinal problem(s):negative GI  Genito-urinary problem(s):negative  Musculoskeletal problem(s):negative  Integumentary problem(s):negative  Neurological problem(s):unsteady gait and frequent falls  Endocrine problem(s):diabetes  Hematologic and/or Lymphatic problem(s):negative    PHYSICAL EXAMINATION  Blood pressure 126/60, pulse 72, resp. rate 16, weight 81.6 kg (180 lb), SpO2 96 %.  Neck: no JVD, 2+ carotid pulses bilaterally, no bruits  Pulmonary: normal chest  excursion, clear to auscultation  Cardiovascular: normal S1, normal S2, HELEN at RUSB 1/6, Regular rhythm      Assessment and Plan:   CAD:  -Because of symptoms, recommend further assessment with lexiscan stress test; hold carvedilol for 24 hours prior     Carotid stenosis:  -Recommend to reassess with US Carotids  -Advised that she cannot do CTA as previously ordered due to dye allergy and kidney failure    PAF:  -Follow[facility] by EP [facility]  -Anticoagulated on Eliquis 2.5 mg BID  -Palpitations should be reported to EP; can interrogate PPM for more information     LVH:  -Mild per ECHO    HTN:  -Well-controlled     HPL:  -On a statin  -Well-controlled, with exception of elevated triglycerides    Follow up: 3 months    On 10/27/2023, Sena JONES RN scribed the services personally performed by Eze Mann MD    I have seen, examined, and have evaluated this patient. Please see above notes by RN scribe that I have reviewed and modified.   I attest that I performed the work for this encounter and agree with the above documentation completed by the scribe.      no dysuria, no frequency, and no hematuria.

## 2024-05-15 ENCOUNTER — APPOINTMENT (OUTPATIENT)
Dept: CARDIOLOGY | Facility: CLINIC | Age: 64
End: 2024-05-15
Payer: COMMERCIAL

## 2024-05-15 ENCOUNTER — NON-APPOINTMENT (OUTPATIENT)
Age: 64
End: 2024-05-15

## 2024-05-15 VITALS
HEIGHT: 65 IN | OXYGEN SATURATION: 94 % | HEART RATE: 126 BPM | WEIGHT: 200 LBS | DIASTOLIC BLOOD PRESSURE: 103 MMHG | TEMPERATURE: 98 F | SYSTOLIC BLOOD PRESSURE: 172 MMHG | BODY MASS INDEX: 33.32 KG/M2

## 2024-05-15 DIAGNOSIS — Z86.718 PERSONAL HISTORY OF OTHER VENOUS THROMBOSIS AND EMBOLISM: ICD-10-CM

## 2024-05-15 DIAGNOSIS — I10 ESSENTIAL (PRIMARY) HYPERTENSION: ICD-10-CM

## 2024-05-15 DIAGNOSIS — R39.89 OTHER SYMPTOMS AND SIGNS INVOLVING THE GENITOURINARY SYSTEM: ICD-10-CM

## 2024-05-15 DIAGNOSIS — Z87.898 PERSONAL HISTORY OF OTHER SPECIFIED CONDITIONS: ICD-10-CM

## 2024-05-15 DIAGNOSIS — I25.5 ISCHEMIC CARDIOMYOPATHY: ICD-10-CM

## 2024-05-15 DIAGNOSIS — I25.10 ATHEROSCLEROTIC HEART DISEASE OF NATIVE CORONARY ARTERY W/OUT ANGINA PECTORIS: ICD-10-CM

## 2024-05-15 PROCEDURE — G2211 COMPLEX E/M VISIT ADD ON: CPT | Mod: NC,1L

## 2024-05-15 PROCEDURE — 93000 ELECTROCARDIOGRAM COMPLETE: CPT

## 2024-05-15 PROCEDURE — 99214 OFFICE O/P EST MOD 30 MIN: CPT | Mod: 25

## 2024-05-15 RX ORDER — METFORMIN HYDROCHLORIDE 500 MG/1
500 TABLET, COATED ORAL TWICE DAILY
Refills: 5 | Status: DISCONTINUED | COMMUNITY
End: 2024-05-15

## 2024-05-15 RX ORDER — CARVEDILOL 12.5 MG/1
12.5 TABLET, FILM COATED ORAL TWICE DAILY
Qty: 180 | Refills: 3 | Status: ACTIVE | COMMUNITY
Start: 2018-11-08 | End: 1900-01-01

## 2024-05-15 RX ORDER — LISINOPRIL AND HYDROCHLOROTHIAZIDE TABLETS 20; 12.5 MG/1; MG/1
20-12.5 TABLET ORAL DAILY
Refills: 0 | Status: ACTIVE | COMMUNITY

## 2024-05-15 RX ORDER — GLIPIZIDE 5 MG/1
5 TABLET, EXTENDED RELEASE ORAL DAILY
Refills: 0 | Status: ACTIVE | COMMUNITY

## 2024-05-15 RX ORDER — SODIUM PICOSULFATE, MAGNESIUM OXIDE, AND ANHYDROUS CITRIC ACID 10; 3.5; 12 MG/160ML; G/160ML; G/160ML
10-3.5-12 MG-GM LIQUID ORAL
Qty: 1 | Refills: 0 | Status: DISCONTINUED | COMMUNITY
Start: 2019-02-04 | End: 2024-05-15

## 2024-05-15 RX ORDER — SITAGLIPTIN AND METFORMIN HYDROCHLORIDE 50; 1000 MG/1; MG/1
50-1000 TABLET, FILM COATED ORAL TWICE DAILY
Refills: 0 | Status: ACTIVE | COMMUNITY

## 2024-05-15 RX ORDER — FINASTERIDE 5 MG/1
5 TABLET, FILM COATED ORAL DAILY
Qty: 90 | Refills: 3 | Status: DISCONTINUED | COMMUNITY
Start: 2021-10-20 | End: 2024-05-15

## 2024-05-15 RX ORDER — APIXABAN 5 MG/1
5 TABLET, FILM COATED ORAL
Qty: 180 | Refills: 3 | Status: DISCONTINUED | COMMUNITY
End: 2024-05-15

## 2024-05-15 RX ORDER — LISINOPRIL 10 MG/1
10 TABLET ORAL DAILY
Qty: 90 | Refills: 3 | Status: DISCONTINUED | COMMUNITY
Start: 2018-11-08 | End: 2024-05-15

## 2024-05-15 NOTE — PHYSICAL EXAM
[Well Developed] : well developed [Well Nourished] : well nourished [No Acute Distress] : no acute distress [Obese] : obese [Normal Conjunctiva] : normal conjunctiva [Normal Venous Pressure] : normal venous pressure [No Carotid Bruit] : no carotid bruit [Normal S1, S2] : normal S1, S2 [No Murmur] : no murmur [No Rub] : no rub [No Gallop] : no gallop [Clear Lung Fields] : clear lung fields [Good Air Entry] : good air entry [No Respiratory Distress] : no respiratory distress  [Soft] : abdomen soft [Non Tender] : non-tender [Normal Gait] : normal gait [No Edema] : no edema [No Cyanosis] : no cyanosis [No Rash] : no rash [No Skin Lesions] : no skin lesions [Moves all extremities] : moves all extremities [No Focal Deficits] : no focal deficits [Normal Speech] : normal speech [Alert and Oriented] : alert and oriented [Normal memory] : normal memory

## 2024-05-15 NOTE — CARDIOLOGY SUMMARY
[de-identified] : 05/15/24 - normal sinus rhythm, normal ECG [de-identified] : 03/14/19 (exercise ECG) - 4 METS, 81% MPHR, 04:19 min, excessive increase in HR, no ECG abnormalities, no CP, Duke score 4 06/02/17 (regadenoson myoview) - no clear evidence of ischemia, septal wall infarct, LVEF 35% [de-identified] : 10/05/20 - normal LA, mild segmental LV systolic dysfunction, concentric LV remodeling, normal RV size and function, LVEF 48% [de-identified] : 12/22/17 (PCI) - PROMUS PREMIER stent to mLAD 60% 12/22/17 (CATH) - pLAD 20% ISR, mLAD 20% ISR, mLAD 60% (iFR 0.86), mCx 40%, pOM1 30%, mRCA 20%, dRCA 20%, LVEF 50% 10/09/16 (PCI) - SYNERGY stents to pLAD 70% and mLAD 99%

## 2024-05-15 NOTE — DISCUSSION/SUMMARY
[Cardiomyopathy] : cardiomyopathy [Coronary Artery Disease] : coronary artery disease [Stable] : stable [Smoking Cessation] : smoking cessation [Hypertension] : hypertension [Medication Changes Per Orders] : Medication changes are as documented in orders [Low Sodium Diet] : low sodium diet [FreeTextEntry1] : Currently stable from a cardiovascular standpoint. Hypertensive. History of ischemic cardiomyopathy (LVEF 48%). Currently appears euvolemic. Stable CAD (s/p prox and mid LAD stents). Experiences exertional fatigue (consider anginal equivalent in diabetic patient). Will increase carvedilol to 12.5 mg twice daily for management of BP. Continue all other current medications including aspirin and atorvastatin. ECG completed today and reviewed (findings as noted above). Will schedule an exercise nuclear stress test to rule out any significant ischemia. In addition, will schedule an echo to reassess his cardiac structures and function. Pending test results, I will make further recommendations. Low salt diet and smoking cessation advised. Follow up in 4-6 months. [EKG obtained to assist in diagnosis and management of assessed problem(s)] : EKG obtained to assist in diagnosis and management of assessed problem(s)

## 2024-05-15 NOTE — HISTORY OF PRESENT ILLNESS
[FreeTextEntry1] : Currently doing okay. Denies chest pain, shortness of breath or palpitations. Does experience exertional fatigue. Admits to smoking about 8 cigarettes daily. Patient states that he had finished taking Eliquis (history of DVT).

## 2024-05-29 ENCOUNTER — APPOINTMENT (OUTPATIENT)
Dept: CARDIOLOGY | Facility: CLINIC | Age: 64
End: 2024-05-29
Payer: COMMERCIAL

## 2024-05-29 PROCEDURE — 93306 TTE W/DOPPLER COMPLETE: CPT

## 2024-06-06 ENCOUNTER — APPOINTMENT (OUTPATIENT)
Dept: CARDIOLOGY | Facility: CLINIC | Age: 64
End: 2024-06-06
Payer: COMMERCIAL

## 2024-06-06 PROCEDURE — 78452 HT MUSCLE IMAGE SPECT MULT: CPT

## 2024-06-06 PROCEDURE — A9500: CPT

## 2024-06-06 PROCEDURE — 93015 CV STRESS TEST SUPVJ I&R: CPT

## 2024-06-10 ENCOUNTER — APPOINTMENT (OUTPATIENT)
Dept: CARDIOLOGY | Facility: CLINIC | Age: 64
End: 2024-06-10

## 2024-06-12 ENCOUNTER — APPOINTMENT (OUTPATIENT)
Dept: CARDIOLOGY | Facility: CLINIC | Age: 64
End: 2024-06-12

## 2024-08-21 NOTE — ED PROVIDER NOTE - NS_EDPROVIDERDISPOUSERTYPE_ED_A_ED
88-year old s/p fall with left sided rib fractures, transverse process fx, and extrapleural hematoma / hemothorax     #rib fractures, TP fractures, hemothorax/pleural hematoma   - PIC protocol   - pain management   - CT surgery consulted; no acute surgical intervention   - IR agiogram; no evidence of active bleeding   - TLSO for comfort     #Urinary Retention, metabolic/lactic acidosis, LENARD   - volume resuscitation   - flomax   - LENARD resolving       #afib RVR, hypotension, hx of hypertension   - hold amlodipine and ace inhibitor   - c/w PO metoprolol and started on Cardizem  - ECHO 60%   - risks/benefit discussion with cardiology and family regarding Eliquis reinitiation     #metabolic acidosis: mild monitor   #Hypophosphatemia: trend and replete with IV/Oral phosphate.     #acute blood loss anemia: trend and transfuse for HGB <7   - s/p kecentra   - 1 unit PRBC     #Hyperlipidemia: c/w statin.         Optimize nutrition   Bowel regimen   DVT Ppx: SCD + Lovenox  Dispo: PT eval. [facility] 88[facility] with left sided rib fractures, transverse process fx, and extrapleural hematoma / hemothorax     #rib fractures, TP fractures, hemothorax/pleural hematoma   - PIC protocol   - pain management   - CT surgery consulted; no acute surgical intervention   - IR angiogram; no evidence of active bleeding   - TLSO for comfort     #Urinary Retention, metabolic/lactic acidosis, LENARD   - voiding spontaneously   - flomax   - LENARD resolving     #afib RVR, hypotension, hx of hypertension   - hold amlodipine and ace inhibitor   - c/w PO metoprolol and c/w on Cardizem  - ECHO 60%   - risks/benefit discussion with cardiology and family regarding Eliquis reinitiation     #metabolic acidosis: mild monitor   #Hypophosphatemia: trend and replete with IV/Oral phosphate.     #acute blood loss anemia: trend and transfuse for HGB <7   - s/p kecentra   - 1 unit PRBC     #Hyperlipidemia: c/w statin.    Optimize nutrition   Bowel regimen   DVT Ppx: SCD + Lovenox  Dispo: [facility] vs [facility] Attending Attestation (For Attendings USE Only)...

## 2025-01-15 NOTE — ED ADULT NURSE NOTE - NS ED NURSE LEVEL OF CONSCIOUSNESS SPEECH
83y M pmh HTN, HLD, GERD, Afib on Xarelto (last dose 3days ago) presents for eval of anemia. Patient reports episode of hematemesis around Fabi he was followed up with GI, had endoscopy 1/7 which showed area of bleeding at antrum of stomach which was cauterized. Patient reports persistent dark stool since. Patient was scheduled for follow-up blood work and had a hemoglobin of 6.6 so was ordered for an iron infusion with plan for repeat CBC. 1/14 hemoglobin was lower at 6.2 so patient was referred to the ED. Last colonoscopy approximately 5 years ago.     Iron deficiency anemia  - Patient follows with Dr. Guillen at [facility], seen for an initial visit on 1/13. Suspect anemia 2/2 acute blood loss S/P bleeding angioectasia repair by GI on 01/07/2025.   - 1/13 work up showed iron 48, % sat 17, ferritin 42, TSH 2.1, Haptoglobin 104, Retic count 8.02, B­12 471, Folate normal, .6, SPEP/HELGA negative, , Hgb Electrophoresis no hemoglobin variant.   - He started IV Venofer 200 mg x 2 doses on 1/13 and 1/14.  - S/p one unit pRBC 1/14. Ordered for additional transfusion today.  - GI planning EGD and colonoscopy today  - Recommend transfusion for hemoglobin < 7    Will continue to follow.    Mitchell Chris PA-C  Hematology/Oncology  [facility]  [phone] (office)  Evenings and weekends please call MD on call or office Speaking Coherently 83y M pmh HTN, HLD, GERD, Afib on Xarelto (last dose 3days ago) presents for eval of anemia. Patient reports episode of hematemesis around [place] he was followed up with GI, had endoscopy 1/7 which showed area of bleeding at antrum of stomach which was cauterized. Patient reports persistent dark stool since. Patient was scheduled for follow-up blood work and had a hemoglobin of 6.6 so was ordered for an iron infusion with plan for repeat CBC. 1/14 hemoglobin was lower at 6.2 so patient was referred to the ED. Last colonoscopy approximately 5 years ago.     Iron deficiency anemia  - Patient follows with Dr. Guillen at [facility], seen for an initial visit on 1/13. Suspect anemia 2/2 acute blood loss S/P bleeding angioectasia repair by GI on 01/07/2025.   - 1/13 work up showed iron 48, % sat 17, ferritin 42, TSH 2.1, Haptoglobin 104, Retic count 8.02, B­12 471, Folate normal, .6, SPEP/HELGA negative, , Hgb Electrophoresis no hemoglobin variant.   - He started IV Venofer 200 mg x 2 doses on 1/13 and 1/14. Will resume iron infusions as outpatient.   - S/p one unit pRBC 1/14. Ordered for additional pRBC transfusion today.  - GI planning EGD and colonoscopy later today  - Recommend transfusion for hemoglobin < 7    Will continue to follow.    Mitchell Chris PA-C  Hematology/Oncology  [facility]  [phone] (office)  Evenings and weekends please call MD on call or office

## 2025-02-13 NOTE — DISCHARGE NOTE ADULT - NS MD DC FALL RISK RISK
36.8
For information on Fall & Injury Prevention, visit [email or web address]

## 2025-07-09 ENCOUNTER — APPOINTMENT (OUTPATIENT)
Dept: CARDIOLOGY | Facility: CLINIC | Age: 65
End: 2025-07-09
Payer: MEDICARE

## 2025-07-09 ENCOUNTER — NON-APPOINTMENT (OUTPATIENT)
Age: 65
End: 2025-07-09

## 2025-07-09 VITALS
SYSTOLIC BLOOD PRESSURE: 127 MMHG | OXYGEN SATURATION: 94 % | HEART RATE: 84 BPM | BODY MASS INDEX: 33.32 KG/M2 | WEIGHT: 200 LBS | DIASTOLIC BLOOD PRESSURE: 79 MMHG | HEIGHT: 65 IN

## 2025-07-09 PROCEDURE — G2211 COMPLEX E/M VISIT ADD ON: CPT

## 2025-07-09 PROCEDURE — 93000 ELECTROCARDIOGRAM COMPLETE: CPT

## 2025-07-09 PROCEDURE — 99204 OFFICE O/P NEW MOD 45 MIN: CPT

## 2025-07-09 RX ORDER — DAPAGLIFLOZIN 10 MG/1
10 TABLET, FILM COATED ORAL DAILY
Qty: 90 | Refills: 3 | Status: ACTIVE | COMMUNITY

## 2025-07-09 NOTE — CARDIOLOGY SUMMARY
[de-identified] : 07/09/25 - normal sinus rhythm, normal ECG [de-identified] : 06/06/24 (regadenoson MIBI) - medium-sized, moderate defects in the basal anterior and anteroseptal walls that are fixed consistent with an infarct, medium-sized, moderate defects in the inferior and inferoseptal walls that are fixed, totally normalizes with prone imaging suggestive of diaphragmatic attenuation artifact, LVEF 54% 03/14/19 (exercise ECG) - 4 METS, 81% MPHR, 04:19 min, excessive increase in HR, no ECG abnormalities, no CP, Duke score 4 06/02/17 (regadenoson myoview) - no clear evidence of ischemia, septal wall infarct, LVEF 35% [de-identified] : 05/29/24 - no significant valvular issues, normal LA, normal LV and RV size and function, LVEF 55-60% 10/05/20 - normal LA, mild segmental LV systolic dysfunction, concentric LV remodeling, normal RV size and function, LVEF 48% [de-identified] : 12/22/17 (PCI) - PROMUS PREMIER stent to mLAD 60% 12/22/17 (CATH) - pLAD 20% ISR, mLAD 20% ISR, mLAD 60% (iFR 0.86), mCx 40%, pOM1 30%, mRCA 20%, dRCA 20%, LVEF 50% 10/09/16 (PCI) - SYNERGY stents to pLAD 70% and mLAD 99%

## 2025-07-09 NOTE — REVIEW OF SYSTEMS
[Cough] : cough [Negative] : Musculoskeletal [Wheezing] : no wheezing [Coughing Up Blood] : no hemoptysis [Snoring] : no snoring

## 2025-07-09 NOTE — DISCUSSION/SUMMARY
[Cardiomyopathy] : cardiomyopathy [Coronary Artery Disease] : coronary artery disease [Smoking Cessation] : smoking cessation [Hypertension] : hypertension [Stable] : stable [Medication Changes Per Orders] : Medication changes are as documented in orders [Low Sodium Diet] : low sodium diet [EKG obtained to assist in diagnosis and management of assessed problem(s)] : EKG obtained to assist in diagnosis and management of assessed problem(s) [FreeTextEntry1] : Currently stable from a cardiovascular standpoint. Normotensive. History of ischemic cardiomyopathy with interval improvement in LV systolic function (LVEF 48% -> 55-60%). Currently appears euvolemic. Stable CAD (s/p prox and mid LAD stents). No ischemic symptoms. Patient has an occasional cough which is likely secondary to underlying pulmonary issues in an active smoker. Continue current medications including aspirin and atorvastatin 80 mg. ECG completed today and reviewed (findings as noted above). Regular exercise advised. Follow up in 6 months (of note, patient spends most of the year in [facility]).

## 2025-07-09 NOTE — HISTORY OF PRESENT ILLNESS
[FreeTextEntry1] : Currently doing okay. Denies chest pain, shortness of breath or palpitations. Has a cough at times.

## 2025-07-09 NOTE — DISCUSSION/SUMMARY
[Cardiomyopathy] : cardiomyopathy [Coronary Artery Disease] : coronary artery disease [Smoking Cessation] : smoking cessation [Hypertension] : hypertension [Stable] : stable [Medication Changes Per Orders] : Medication changes are as documented in orders [Low Sodium Diet] : low sodium diet [EKG obtained to assist in diagnosis and management of assessed problem(s)] : EKG obtained to assist in diagnosis and management of assessed problem(s) [FreeTextEntry1] : Currently stable from a cardiovascular standpoint. Normotensive. History of ischemic cardiomyopathy with interval improvement in LV systolic function (LVEF 48% -> 55-60%). Currently appears euvolemic. Stable CAD (s/p prox and mid LAD stents). No ischemic symptoms. Patient has an occasional cough which is likely secondary to underlying pulmonary issues in an active smoker. Continue current medications including aspirin and atorvastatin 80 mg. ECG completed today and reviewed (findings as noted above). Regular exercise advised. Follow up in 6 months (of note, patient spends most of the year in [facility]).

## 2025-07-09 NOTE — CARDIOLOGY SUMMARY
[de-identified] : 07/09/25 - normal sinus rhythm, normal ECG [de-identified] : 06/06/24 (regadenoson MIBI) - medium-sized, moderate defects in the basal anterior and anteroseptal walls that are fixed consistent with an infarct, medium-sized, moderate defects in the inferior and inferoseptal walls that are fixed, totally normalizes with prone imaging suggestive of diaphragmatic attenuation artifact, LVEF 54% 03/14/19 (exercise ECG) - 4 METS, 81% MPHR, 04:19 min, excessive increase in HR, no ECG abnormalities, no CP, Duke score 4 06/02/17 (regadenoson myoview) - no clear evidence of ischemia, septal wall infarct, LVEF 35% [de-identified] : 05/29/24 - no significant valvular issues, normal LA, normal LV and RV size and function, LVEF 55-60% 10/05/20 - normal LA, mild segmental LV systolic dysfunction, concentric LV remodeling, normal RV size and function, LVEF 48% [de-identified] : 12/22/17 (PCI) - PROMUS PREMIER stent to mLAD 60% 12/22/17 (CATH) - pLAD 20% ISR, mLAD 20% ISR, mLAD 60% (iFR 0.86), mCx 40%, pOM1 30%, mRCA 20%, dRCA 20%, LVEF 50% 10/09/16 (PCI) - SYNERGY stents to pLAD 70% and mLAD 99%